# Patient Record
Sex: MALE | Race: WHITE | NOT HISPANIC OR LATINO | Employment: OTHER | URBAN - METROPOLITAN AREA
[De-identification: names, ages, dates, MRNs, and addresses within clinical notes are randomized per-mention and may not be internally consistent; named-entity substitution may affect disease eponyms.]

---

## 2018-07-22 ENCOUNTER — HOSPITAL ENCOUNTER (EMERGENCY)
Facility: HOSPITAL | Age: 53
Discharge: HOME | End: 2018-07-22
Attending: EMERGENCY MEDICINE
Payer: COMMERCIAL

## 2018-07-22 VITALS
RESPIRATION RATE: 16 BRPM | HEIGHT: 67 IN | DIASTOLIC BLOOD PRESSURE: 56 MMHG | TEMPERATURE: 98.7 F | SYSTOLIC BLOOD PRESSURE: 109 MMHG | BODY MASS INDEX: 21.97 KG/M2 | WEIGHT: 140 LBS | HEART RATE: 64 BPM | OXYGEN SATURATION: 99 %

## 2018-07-22 DIAGNOSIS — B20 HIV (HUMAN IMMUNODEFICIENCY VIRUS INFECTION) (HCC): ICD-10-CM

## 2018-07-22 DIAGNOSIS — N34.2 URETHRITIS: Primary | ICD-10-CM

## 2018-07-22 LAB
BACTERIA UR QL AUTO: ABNORMAL /HPF
BILIRUB UR QL STRIP: NEGATIVE
CLARITY UR: CLEAR
COLOR UR: YELLOW
GLUCOSE UR STRIP-MCNC: NEGATIVE MG/DL
HGB UR QL STRIP.AUTO: ABNORMAL
KETONES UR STRIP-MCNC: NEGATIVE MG/DL
LEUKOCYTE ESTERASE UR QL STRIP: ABNORMAL
NITRITE UR QL STRIP: NEGATIVE
NON-SQ EPI CELLS URNS QL MICRO: ABNORMAL /HPF
OTHER STN SPEC: ABNORMAL
PH UR STRIP.AUTO: 6 [PH] (ref 4.5–8)
PROT UR STRIP-MCNC: ABNORMAL MG/DL
RBC #/AREA URNS AUTO: ABNORMAL /HPF
SP GR UR STRIP.AUTO: 1.01 (ref 1–1.03)
UROBILINOGEN UR QL STRIP.AUTO: 0.2 E.U./DL
WBC #/AREA URNS AUTO: ABNORMAL /HPF

## 2018-07-22 PROCEDURE — 96372 THER/PROPH/DIAG INJ SC/IM: CPT

## 2018-07-22 PROCEDURE — 87591 N.GONORRHOEAE DNA AMP PROB: CPT | Performed by: EMERGENCY MEDICINE

## 2018-07-22 PROCEDURE — 81001 URINALYSIS AUTO W/SCOPE: CPT | Performed by: EMERGENCY MEDICINE

## 2018-07-22 PROCEDURE — 87086 URINE CULTURE/COLONY COUNT: CPT | Performed by: EMERGENCY MEDICINE

## 2018-07-22 PROCEDURE — 87491 CHLMYD TRACH DNA AMP PROBE: CPT | Performed by: EMERGENCY MEDICINE

## 2018-07-22 PROCEDURE — 99283 EMERGENCY DEPT VISIT LOW MDM: CPT

## 2018-07-22 RX ORDER — AZITHROMYCIN 250 MG/1
1000 TABLET, FILM COATED ORAL ONCE
Status: COMPLETED | OUTPATIENT
Start: 2018-07-22 | End: 2018-07-22

## 2018-07-22 RX ADMIN — AZITHROMYCIN 1000 MG: 250 TABLET, FILM COATED ORAL at 10:09

## 2018-07-22 RX ADMIN — CEFTRIAXONE SODIUM 250 MG: 250 INJECTION, POWDER, FOR SOLUTION INTRAMUSCULAR; INTRAVENOUS at 10:09

## 2018-07-22 NOTE — ED PROVIDER NOTES
History  Chief Complaint   Patient presents with    Exposure to STD     Patient stated that he thinks he has an STD     1d penile urethral drainage after unprotected intercourse  Source pt disease status not known but pt presents to ED concerned for STI  No fever chills vomiting weakness or systemic complaints, no abd pain or syncope  Currently  Symptomatic  Does have HIV but reports undetectable viral load and last CD4 count 900  None       Past Medical History:   Diagnosis Date    HIV (human immunodeficiency virus infection) ([facility] [address] )     Hyperlipidemia     Lyme disease        Past Surgical History:   Procedure Laterality Date    CYST REMOVAL         History reviewed  No pertinent family history  I have reviewed and agree with the history as documented  Social History   Substance Use Topics    Smoking status: Never Smoker    Smokeless tobacco: Never Used    Alcohol use No        Review of Systems   Constitutional: Negative for activity change, fatigue, fever and unexpected weight change  Eyes: Negative for discharge and itching  Respiratory: Negative for cough and shortness of breath  Cardiovascular: Negative for chest pain, palpitations and leg swelling  Gastrointestinal: Negative for abdominal distention and abdominal pain  Endocrine: Negative for polydipsia and polyuria  Genitourinary: Positive for difficulty urinating, discharge and dysuria  Negative for flank pain, frequency, hematuria, penile pain and urgency  Musculoskeletal: Negative for arthralgias, back pain, gait problem and joint swelling  Neurological: Negative for dizziness, weakness and headaches  Hematological: Negative  Physical Exam  Physical Exam   Constitutional: He is oriented to person, place, and time  He appears well-developed and well-nourished  HENT:   Head: Normocephalic and atraumatic  Eyes: EOM are normal  Pupils are equal, round, and reactive to light     Pulmonary/Chest: Effort normal    Musculoskeletal: Normal range of motion  Neurological: He is alert and oriented to person, place, and time  Skin: Skin is warm and dry  Capillary refill takes less than 2 seconds  Nursing note and vitals reviewed  Vital Signs  ED Triage Vitals [07/22/18 0934]   Temperature Pulse Respirations Blood Pressure SpO2   98 7 °F (37 1 °C) 64 16 109/56 99 %      Temp src Heart Rate Source Patient Position - Orthostatic VS BP Location FiO2 (%)   -- Monitor Sitting Right arm --      Pain Score       --           Vitals:    07/22/18 0934   BP: 109/56   Pulse: 64   Patient Position - Orthostatic VS: Sitting       Visual Acuity      ED Medications  Medications   cefTRIAXone (ROCEPHIN) 250 mg in sterile water IM only syringe (250 mg Intramuscular Given 7/22/18 1009)   azithromycin (ZITHROMAX) tablet 1,000 mg (1,000 mg Oral Given 7/22/18 1009)       Diagnostic Studies  Results Reviewed     Procedure Component Value Units Date/Time    Urine Microscopic [[id number]]  (Abnormal) Collected:  07/22/18 1007    Lab Status:  Final result Specimen:  Urine from Urine, Clean Catch Updated:  07/22/18 1229     RBC, UA 4-10 (A) /hpf      WBC, UA 10-20 (A) /hpf      Epithelial Cells Occasional /hpf      Bacteria, UA None Seen /hpf      OTHER OBSERVATIONS WBCs Clumped    UA w Reflex to Microscopic [[id number]]  (Abnormal) Collected:  07/22/18 1007    Lab Status:  Final result Specimen:  Urine from Urine, Clean Catch Updated:  07/22/18 1218     Color, UA Yellow     Clarity, UA Clear     Specific Gravity, UA 1 015     pH, UA 6 0     Leukocytes, UA Small (A)     Nitrite, UA Negative     Protein, UA 30 (1+) (A) mg/dl      Glucose, UA Negative mg/dl      Ketones, UA Negative mg/dl      Urobilinogen, UA 0 2 E U /dl      Bilirubin, UA Negative     Blood, UA Small (A)    Chlamydia/GC amplified DNA by PCR [[id number]] Collected:  07/22/18 1008    Lab Status:   In process Specimen:  Urine from Urine, Other Updated:  07/22/18 1017    Urine culture [[id number]] Collected:  07/22/18 1008    Lab Status: In process Specimen:  Urine from Urine, Clean Catch Updated:  07/22/18 1015                 No orders to display              Procedures  Procedures       Phone Contacts  ED Phone Contact    ED Course  ED Course as of Jul 22 1636   Sun Jul 22, 2018   2696 Dysuria and suspected STI based on pt report  Plan - empiric tx for sti, UA cx and GC/chlamydia pending at time of d/c                                MDM  Number of Diagnoses or Management Options  HIV (human immunodeficiency virus infection) ([facility] ):   Urethritis:   Diagnosis management comments: Suspected STI  Plan - abx for empiric coverage, urine for GC/chlamydia, d/c home, aware of need to contact sexual partners if positive, rted if worsening sxs etc         Amount and/or Complexity of Data Reviewed  Clinical lab tests: ordered and reviewed      CritCare Time    Disposition  Final diagnoses:   Urethritis   HIV (human immunodeficiency virus infection) ([facility] )     Time reflects when diagnosis was documented in both MDM as applicable and the Disposition within this note     Time User Action Codes Description Comment    7/22/2018  9:49 AM Colon Organ Add [N34 2] Urethritis     7/22/2018  9:49 AM Ramon, Skip Kail Add [B20] HIV (human immunodeficiency virus infection) [facility])       ED Disposition     ED Disposition Condition Comment    Discharge  Perez Azul discharge to home/self care  Condition at discharge: Stable        Follow-up Information    None         There are no discharge medications for this patient  No discharge procedures on file      ED Provider  Electronically Signed by           Saritha Mcclure MD  07/22/18 1345

## 2018-07-22 NOTE — DISCHARGE INSTRUCTIONS
Nonspecific Urethritis in Men   WHAT YOU NEED TO KNOW:   Nonspecific urethritis is a condition that causes inflammation of the urethra  The urethra is the tube where urine passes from the bladder to the outside of the body  Men who have sex with men and those with multiple sexual partners are at a high risk of having this condition  DISCHARGE INSTRUCTIONS:   Medicines:   · Antibiotics: This medicine is used to treat an infection caused by bacteria  Take them as directed  · Take your medicine as directed  Contact your healthcare provider if you think your medicine is not helping or if you have side effects  Tell him of her if you are allergic to any medicine  Keep a list of the medicines, vitamins, and herbs you take  Include the amounts, and when and why you take them  Bring the list or the pill bottles to follow-up visits  Carry your medicine list with you in case of an emergency  Follow up with your healthcare provider as directed:  Write down your questions so you remember to ask them during your visits  Manage your symptoms:   · Heat:  Sit in a warm bath for 15 minutes at least 2 times a day  · Do not use chemical irritants: This includes bath soaps, spermicides, or other products that may cause irritation  Prevent nonspecific urethritis:  If your urethritis was caused by an infection, the following may help to prevent the spread:  · Use condoms:  Wear a condom during oral, vaginal, and anal sex  Ask for more information about the correct way to use condoms  · Do not have sex with someone who has urethritis: This includes oral, vaginal, and anal sex  · Do not have sex during treatment:  Do not have sex while you or your partners are being treated  Ask when it is safe to have sex  · Report pregnancy:  Tell your healthcare provider if your female partner is pregnant  You may have spread an infection to her, and she may pass it to her infant during birth    Contact your healthcare provider if:   · You have a fever  · You have chills, a cough, or feel weak and achy  · You continue to have signs or symptoms after being treated for nonspecific urethritis  · You have questions or concerns about your condition or care  Return to the emergency department if:   · You have joint stiffness, muscle pain, or eye inflammation  · You have pain and swelling in your scrotum  · You have severe abdominal pain  · You have chest pain or trouble breathing  © 2017 [facility] Information is for End User's use only and may not be sold, redistributed or otherwise used for commercial purposes  All illustrations and images included in CareNotes® are the copyrighted property of A D A M , Inc  or Ariel Marinelli  The above information is an  only  It is not intended as medical advice for individual conditions or treatments  Talk to your doctor, nurse or pharmacist before following any medical regimen to see if it is safe and effective for you

## 2018-07-23 LAB — BACTERIA UR CULT: NORMAL

## 2018-08-01 LAB
CHLAMYDIA DNA CVX QL NAA+PROBE: ABNORMAL
N GONORRHOEA DNA GENITAL QL NAA+PROBE: ABNORMAL

## 2019-07-10 ENCOUNTER — TELEPHONE (OUTPATIENT)
Dept: NEPHROLOGY | Facility: CLINIC | Age: 54
End: 2019-07-10

## 2019-07-16 ENCOUNTER — TELEPHONE (OUTPATIENT)
Dept: NEPHROLOGY | Facility: CLINIC | Age: 54
End: 2019-07-16

## 2019-07-16 NOTE — TELEPHONE ENCOUNTER
Called and left a message on machine for patient confirming his appointment for Thursday 7/18/2019 Nephrology Consult with Dr Irina Cheatham Sender,

## 2019-07-18 ENCOUNTER — CONSULT (OUTPATIENT)
Dept: NEPHROLOGY | Facility: CLINIC | Age: 54
End: 2019-07-18
Payer: COMMERCIAL

## 2019-07-18 VITALS
DIASTOLIC BLOOD PRESSURE: 80 MMHG | TEMPERATURE: 98.1 F | WEIGHT: 145 LBS | SYSTOLIC BLOOD PRESSURE: 126 MMHG | RESPIRATION RATE: 16 BRPM | BODY MASS INDEX: 22.76 KG/M2 | HEART RATE: 57 BPM | HEIGHT: 67 IN

## 2019-07-18 DIAGNOSIS — N18.30 CKD (CHRONIC KIDNEY DISEASE) STAGE 3, GFR 30-59 ML/MIN (HCC): Primary | ICD-10-CM

## 2019-07-18 PROCEDURE — 99204 OFFICE O/P NEW MOD 45 MIN: CPT | Performed by: INTERNAL MEDICINE

## 2019-07-18 RX ORDER — PRAVASTATIN SODIUM 20 MG
TABLET ORAL
COMMUNITY
Start: 2019-07-08

## 2019-07-18 NOTE — PROGRESS NOTES
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, chronic kidney disease stage 3, proteinuria who is referred to Renal Clinic by his PCP due to elevated renal parameters  Patient is well known to having underlying kidney disease and had seen a nephrologist approximately 4 years ago for total of 2 years  Patient states that he was told that his kidney function is stable and does not need to be seen in the renal clinic  In the year 2017, patient was noted to have serum creatinine as high as 1 6 with estimated GFR 42  Repeat labs performed in the same year continued to show estimated GFR of 60  He was also noted to have macroalbuminuria up to 350 mg  Patient denies having any foamy urine or taking nephrotoxic medications such as NSAIDs in the interim          PAST MEDICAL HISTORY:  Past Medical History:   Diagnosis Date    HIV [facility]human immunodeficiency virus infection) ([facility] [address] )     Hyperlipidemia     Lyme disease        PAST SURGICAL HISTORY:  Past Surgical History:   Procedure Laterality Date    CYST REMOVAL         SOCIAL HISTORY:  Social History     Substance and Sexual Activity   Alcohol Use No     Social History     Substance and Sexual Activity   Drug Use No     Social History     Tobacco Use   Smoking Status Never Smoker   Smokeless Tobacco Never Used       FAMILY HISTORY:  Family History   Problem Relation Age of Onset    Hypertension Mother     No Known Problems Father        ALLERGY:  No Known Allergies    MEDICATIONS:    Current Outpatient Medications:     bictegravir-emtricitab-tenofovir alafenamide (BIKTARVY) -25 MG tablet, Take by mouth, Disp: , Rfl:     pravastatin (PRAVACHOL) 20 mg tablet, , Disp: , Rfl:     REVIEW OF SYSTEMS:    Review of Systems   Constitutional: Negative  HENT: Negative  Eyes: Negative  Respiratory: Negative  Cardiovascular: Negative  Gastrointestinal: Negative  Endocrine: Negative  Genitourinary: Negative  Musculoskeletal: Positive for arthralgias  Skin: Negative  Allergic/Immunologic: Negative  Neurological: Negative  Hematological: Negative  All other systems reviewed and are negative  PHYSICAL EXAM:  Vitals:    07/18/19 1301   BP: 126/80   BP Location: Left arm   Patient Position: Sitting   Cuff Size: Adult   Pulse: 57   Resp: 16   Temp: 98 1 °F (36 7 °C)   TempSrc: Oral   Weight: 65 8 kg (145 lb)   Height: 5' 7" (1 702 m)     Body mass index is 22 71 kg/m²  Physical Exam   Constitutional: He is oriented to person, place, and time  He appears well-developed and well-nourished  HENT:   Head: Normocephalic and atraumatic  Eyes: Pupils are equal, round, and reactive to light  Neck: Neck supple  Cardiovascular: Normal rate, regular rhythm and normal heart sounds  Pulmonary/Chest: Effort normal    Abdominal: Soft  Bowel sounds are normal    Musculoskeletal: Normal range of motion  Neurological: He is alert and oriented to person, place, and time  Skin: Skin is warm  Psychiatric: He has a normal mood and affect         LAB RESULTS:  Results for orders placed or performed during the hospital encounter of 07/22/18   Urine culture   Result Value Ref Range    Urine Culture No Growth <1000 cfu/mL    Chlamydia/GC amplified DNA by PCR   Result Value Ref Range    N gonorrhoeae, DNA Probe N  gonorrhoeae Amplified DNA POSITIVE (A) N  gonorrhoeae Amplified DNA Negative    Chlamydia, DNA Probe C  trachomatis Amplified DNA Negative C  trachomatis Amplified DNA Negative   UA w Reflex to Microscopic   Result Value Ref Range    Color, UA Yellow     Clarity, UA Clear     Specific [place], UA 1 015 1 003 - 1 030    pH, UA 6 0 4 5 - 8 0    Leukocytes, UA Small (A) Negative    Nitrite, UA Negative Negative    Protein, UA 30 (1+) (A) Negative mg/dl    Glucose, UA Negative Negative mg/dl    Ketones, UA Negative Negative mg/dl    Urobilinogen, UA 0 2 0 2, 1 0 E U /dl E U /dl    Bilirubin, UA Negative Negative    Blood, UA Small (A) Negative   Urine Microscopic   Result Value Ref Range    RBC, UA 4-10 (A) None Seen, 0-5 /hpf    WBC, UA 10-20 (A) None Seen, 0-5, 5-55, 5-65 /hpf    Epithelial Cells Occasional None Seen, Occasional /hpf    Bacteria, UA None Seen None Seen, Occasional /hpf    OTHER OBSERVATIONS WBCs Clumped          ASSESSMENT and PLAN:  Butler Lanes was seen today for consult  Diagnoses and all orders for this visit:    CKD (chronic kidney disease) stage 3, GFR 30-59 ml/min ([facility])  -     Albumin; Standing  -     Calcium; Standing  -     CBC and differential; Standing  -     Comprehensive metabolic panel; Standing  -     Phosphorus; Standing  -     Protein / creatinine ratio, urine; Standing  -     PTH, intact; Standing  -     Urinalysis with microscopic; Standing  -     Vitamin D 25 hydroxy; Standing  -     US kidney and bladder; Future  -     Albumin  -     Calcium  -     CBC and differential  -     Comprehensive metabolic panel  -     Phosphorus  -     Protein / creatinine ratio, urine  -     PTH, intact  -     Urinalysis with microscopic  -     Vitamin D 25 hydroxy      44-year-old male with past He of HIV, cryptococcal meningitis, proteinuria, chronic kidney disease stage 3 who is referred to Renal Clinic for management of CKD    1  Chronic kidney disease stage 3:  Etiology unknown but 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 or secondary to effects of HAART medication  Compared to 2 years ago, patient's serum creatinine appears to be stable at 1 7  We will Perform a renal ultrasound to evaluate patient's renal parenchyma  Avoidance of nephrotoxic medications such as NSAIDs recommended      2  Proteinuria:  Patient noted to have microalbuminuria at least 2 years ago will repeat urinalysis and assess proteinuria prior to next visit  3  Bone mineral disorder:  Will assess patient's 25 hydroxy vitamin-D status, parathyroid hormone intact, calcium phosphorus levels  4  HIV:  Appears to be stable while on medications  The most recent CD4 count was noted to be 900  Patient was instructed that he has remains at risk of glomerulonephritis emanating from HIV disease in his lifetime  5  Nutrition:  Protein diet up to 40 g daily, moderate potassium and phosphorus diet recommended  Elda Horne

## 2019-07-18 NOTE — PATIENT INSTRUCTIONS
Drink atleat 2 5 L of fluids daily  Eat moderate amounts of protein, potassium and phosphorus  Perform labs prior to next appointment  Avoid NSAIDs

## 2019-10-18 ENCOUNTER — APPOINTMENT (OUTPATIENT)
Dept: LAB | Facility: HOSPITAL | Age: 54
End: 2019-10-18
Payer: COMMERCIAL

## 2019-10-18 ENCOUNTER — HOSPITAL ENCOUNTER (OUTPATIENT)
Dept: ULTRASOUND IMAGING | Facility: HOSPITAL | Age: 54
Discharge: HOME | End: 2019-10-18
Payer: COMMERCIAL

## 2019-10-18 DIAGNOSIS — N18.30 CKD (CHRONIC KIDNEY DISEASE) STAGE 3, GFR 30-59 ML/MIN (HCC): ICD-10-CM

## 2019-10-18 LAB
25(OH)D3 SERPL-MCNC: 29.9 NG/ML (ref 30–100)
ALBUMIN SERPL BCP-MCNC: 3.9 G/DL (ref 3.5–5)
ALP SERPL-CCNC: 99 U/L (ref 46–116)
ALT SERPL W P-5'-P-CCNC: 64 U/L (ref 12–78)
ANION GAP SERPL CALCULATED.3IONS-SCNC: 9 MMOL/L (ref 4–13)
AST SERPL W P-5'-P-CCNC: 33 U/L (ref 5–45)
BACTERIA UR QL AUTO: ABNORMAL /HPF
BASOPHILS # BLD AUTO: 0.06 THOUSANDS/ΜL (ref 0–0.1)
BASOPHILS NFR BLD AUTO: 1 % (ref 0–1)
BILIRUB SERPL-MCNC: 1 MG/DL (ref 0.2–1)
BILIRUB UR QL STRIP: NEGATIVE
BUN SERPL-MCNC: 15 MG/DL (ref 5–25)
CALCIUM SERPL-MCNC: 9 MG/DL (ref 8.3–10.1)
CHLORIDE SERPL-SCNC: 99 MMOL/L (ref 100–108)
CLARITY UR: CLEAR
CO2 SERPL-SCNC: 29 MMOL/L (ref 21–32)
COLOR UR: ABNORMAL
CREAT SERPL-MCNC: 1.53 MG/DL (ref 0.6–1.3)
CREAT UR-MCNC: 22.4 MG/DL
EOSINOPHIL # BLD AUTO: 0.18 THOUSAND/ΜL (ref 0–0.61)
EOSINOPHIL NFR BLD AUTO: 2 % (ref 0–6)
ERYTHROCYTE [DISTWIDTH] IN BLOOD BY AUTOMATED COUNT: 12.4 % (ref 11.6–15.1)
GFR SERPL CREATININE-BSD FRML MDRD: 51 ML/MIN/1.73SQ M
GLUCOSE SERPL-MCNC: 77 MG/DL (ref 65–140)
GLUCOSE UR STRIP-MCNC: NEGATIVE MG/DL
HCT VFR BLD AUTO: 49 % (ref 36.5–49.3)
HGB BLD-MCNC: 17.2 G/DL (ref 12–17)
HGB UR QL STRIP.AUTO: NEGATIVE
IMM GRANULOCYTES # BLD AUTO: 0.04 THOUSAND/UL (ref 0–0.2)
IMM GRANULOCYTES NFR BLD AUTO: 1 % (ref 0–2)
KETONES UR STRIP-MCNC: NEGATIVE MG/DL
LEUKOCYTE ESTERASE UR QL STRIP: NEGATIVE
LYMPHOCYTES # BLD AUTO: 3.08 THOUSANDS/ΜL (ref 0.6–4.47)
LYMPHOCYTES NFR BLD AUTO: 35 % (ref 14–44)
MCH RBC QN AUTO: 32.3 PG (ref 26.8–34.3)
MCHC RBC AUTO-ENTMCNC: 35.1 G/DL (ref 31.4–37.4)
MCV RBC AUTO: 92 FL (ref 82–98)
MONOCYTES # BLD AUTO: 0.96 THOUSAND/ΜL (ref 0.17–1.22)
MONOCYTES NFR BLD AUTO: 11 % (ref 4–12)
NEUTROPHILS # BLD AUTO: 4.45 THOUSANDS/ΜL (ref 1.85–7.62)
NEUTS SEG NFR BLD AUTO: 50 % (ref 43–75)
NITRITE UR QL STRIP: NEGATIVE
NON-SQ EPI CELLS URNS QL MICRO: ABNORMAL /HPF
NRBC BLD AUTO-RTO: 0 /100 WBCS
PH UR STRIP.AUTO: 5.5 [PH]
PHOSPHATE SERPL-MCNC: 2.8 MG/DL (ref 2.7–4.5)
PLATELET # BLD AUTO: 228 THOUSANDS/UL (ref 149–390)
PMV BLD AUTO: 10.5 FL (ref 8.9–12.7)
POTASSIUM SERPL-SCNC: 4.2 MMOL/L (ref 3.5–5.3)
PROT SERPL-MCNC: 8.1 G/DL (ref 6.4–8.2)
PROT UR STRIP-MCNC: NEGATIVE MG/DL
PROT UR-MCNC: 12 MG/DL
PROT/CREAT UR: 0.54 MG/G{CREAT} (ref 0–0.1)
PTH-INTACT SERPL-MCNC: 67.1 PG/ML (ref 18.4–80.1)
RBC # BLD AUTO: 5.33 MILLION/UL (ref 3.88–5.62)
RBC #/AREA URNS AUTO: ABNORMAL /HPF
SODIUM SERPL-SCNC: 137 MMOL/L (ref 136–145)
SP GR UR STRIP.AUTO: <=1.005 (ref 1–1.03)
UROBILINOGEN UR QL STRIP.AUTO: 0.2 E.U./DL
WBC # BLD AUTO: 8.77 THOUSAND/UL (ref 4.31–10.16)
WBC #/AREA URNS AUTO: ABNORMAL /HPF

## 2019-10-18 PROCEDURE — 76770 US EXAM ABDO BACK WALL COMP: CPT

## 2019-10-18 PROCEDURE — 85025 COMPLETE CBC W/AUTO DIFF WBC: CPT | Performed by: INTERNAL MEDICINE

## 2019-10-18 PROCEDURE — 36415 COLL VENOUS BLD VENIPUNCTURE: CPT | Performed by: INTERNAL MEDICINE

## 2019-10-18 PROCEDURE — 82570 ASSAY OF URINE CREATININE: CPT | Performed by: INTERNAL MEDICINE

## 2019-10-18 PROCEDURE — 83970 ASSAY OF PARATHORMONE: CPT | Performed by: INTERNAL MEDICINE

## 2019-10-18 PROCEDURE — 84156 ASSAY OF PROTEIN URINE: CPT | Performed by: INTERNAL MEDICINE

## 2019-10-18 PROCEDURE — 80053 COMPREHEN METABOLIC PANEL: CPT | Performed by: INTERNAL MEDICINE

## 2019-10-18 PROCEDURE — 82306 VITAMIN D 25 HYDROXY: CPT | Performed by: INTERNAL MEDICINE

## 2019-10-18 PROCEDURE — 84100 ASSAY OF PHOSPHORUS: CPT | Performed by: INTERNAL MEDICINE

## 2019-10-18 PROCEDURE — 81001 URINALYSIS AUTO W/SCOPE: CPT | Performed by: INTERNAL MEDICINE

## 2019-10-24 ENCOUNTER — OFFICE VISIT (OUTPATIENT)
Dept: NEPHROLOGY | Facility: CLINIC | Age: 54
End: 2019-10-24
Payer: COMMERCIAL

## 2019-10-24 VITALS
BODY MASS INDEX: 22.55 KG/M2 | HEIGHT: 68 IN | SYSTOLIC BLOOD PRESSURE: 130 MMHG | WEIGHT: 148.8 LBS | DIASTOLIC BLOOD PRESSURE: 82 MMHG | TEMPERATURE: 97.8 F | HEART RATE: 54 BPM | RESPIRATION RATE: 16 BRPM

## 2019-10-24 DIAGNOSIS — R80.1 PERSISTENT PROTEINURIA: Primary | ICD-10-CM

## 2019-10-24 PROCEDURE — 99214 OFFICE O/P EST MOD 30 MIN: CPT | Performed by: INTERNAL MEDICINE

## 2019-10-24 RX ORDER — LISINOPRIL 5 MG/1
5 TABLET ORAL DAILY
Qty: 90 TABLET | Refills: 3 | Status: SHIPPED | OUTPATIENT
Start: 2019-10-24 | End: 2021-03-03

## 2019-10-24 NOTE — PROGRESS NOTES
NEPHROLOGY PROGRESS NOTE    Patient: Pelon Headley               Sex: male          DOA: No admission date for patient encounter  YOB: 1965        Age:  48 y o         LOS: [unfilled]  10/24/2019        BACKGROUND     48 y o  M with PMH of HIV disease, cryptococcal meningitis, CKD III who has been following up in Renal clinic since at least 2017  SUBJECTIVE      Patient following up after 3 months  Offers no major complaints except ankle pain  REVIEW OF SYSTEMS     Review of Systems   Constitutional: Negative  HENT: Negative  Eyes: Negative  Respiratory: Negative  Cardiovascular: Negative  Gastrointestinal: Negative  Endocrine: Negative  Genitourinary: Negative  Musculoskeletal: Positive for arthralgias  Skin: Negative  Allergic/Immunologic: Negative  Neurological: Negative  Hematological: Negative  All other systems reviewed and are negative  OBJECTIVE     Current Weight: Weight - Scale: 67 5 kg (148 lb 12 8 oz)  Vitals:    10/24/19 1410   BP: 130/82   Pulse: (!) 54   Resp: 16   Temp: 97 8 °F (36 6 °C)     Body mass index is 22 62 kg/m²  [unfilled]    CURRENT MEDICATIONS       Current Outpatient Medications:     bictegravir-emtricitab-tenofovir alafenamide (BIKTARVY) -25 MG tablet, Take by mouth, Disp: , Rfl:     pravastatin (PRAVACHOL) 20 mg tablet, , Disp: , Rfl:     lisinopril (ZESTRIL) 5 mg tablet, Take 1 tablet (5 mg total) by mouth daily, Disp: 90 tablet, Rfl: 3      PHYSICAL EXAMINATION     Physical Exam   Constitutional: He is oriented to person, place, and time  HENT:   Head: Normocephalic and atraumatic  Eyes: Pupils are equal, round, and reactive to light  Neck: Neck supple  No JVD present  Cardiovascular: Normal rate, regular rhythm and normal heart sounds  Exam reveals no friction rub  No murmur heard  Pulmonary/Chest: Effort normal and breath sounds normal    Abdominal: Soft   Bowel sounds are normal  He exhibits no distension  There is no tenderness  There is no rebound  Musculoskeletal: He exhibits no edema or tenderness  Neurological: He is alert and oriented to person, place, and time  Skin: Skin is dry  No rash noted  Psychiatric: He has a normal mood and affect  LAB RESULTS     Results from last 7 days   Lab Units 10/18/19  1428   WBC Thousand/uL 8 77   HEMOGLOBIN g/dL 17 2*   HEMATOCRIT % 49 0   PLATELETS Thousands/uL 228   POTASSIUM mmol/L 4 2   CHLORIDE mmol/L 99*   CO2 mmol/L 29   BUN mg/dL 15   CREATININE mg/dL 1 53*   EGFR ml/min/1 73sq m 51   CALCIUM mg/dL 9 0   PHOSPHORUS mg/dL 2 8           RADIOLOGY RESULTS      Reviewed  ASSESSMENT/PLAN     48 M with PMH of CKD III, cryptococcal meningitis, HIV disease who presents to Renal clinic for f/u     1) CKD III: 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

## 2020-02-27 ENCOUNTER — DOCUMENTATION (OUTPATIENT)
Dept: NEPHROLOGY | Facility: CLINIC | Age: 55
End: 2020-02-27

## 2020-02-27 LAB
CHOLEST SERPL-MCNC: 161 MG/DL (ref 50–200)
EXT DIFF-ABS BASOPHILS: 0.1
EXT DIFF-ABS EOSINOPHILS: 0.1
EXT DIFF-ABS LYMPHOCYTES: 2.6
EXT DIFF-ABS MONOCYTES: 0.5
EXT DIFF-ABS NEUTROPHILS: 2.5
EXT GLUCOSE BLD: 90
EXTERNAL ALBUMIN: 4.4
EXTERNAL ALK PHOS: 82
EXTERNAL ALT: 39
EXTERNAL ANION GAP: 1.5
EXTERNAL AST: 27
EXTERNAL BUN: 17
EXTERNAL CALCIUM: 9.1
EXTERNAL CHLORIDE: 99
EXTERNAL CREATININE: 1.54
EXTERNAL EGFR: 50
EXTERNAL HEMATOCRIT: 47 %
EXTERNAL HEMOGLOBIN: 16.2 G/DL
EXTERNAL MCV: 94
EXTERNAL PLATELET COUNT: 202 K/ΜL
EXTERNAL POTASSIUM: 4.8
EXTERNAL RBC: 4.94
EXTERNAL RDW: 13.9
EXTERNAL SODIUM: 136
EXTERNAL T.BILIRUBIN: 0.6
EXTERNAL TOTAL PROTEIN: 7.3
EXTERNAL WBC: 5.8
HDLC SERPL-MCNC: 62 MG/DL (ref 40–?)
HIV-1 RNA BY PCR, QN (HISTORICAL): <20
HIV1 RNA SERPL NAA+PROBE-LOG#: NORMAL {LOG_COPIES}/ML
LDLC SERPL CALC-MCNC: 84 MG/DL (ref 0–100)
TRIGL SERPL-MCNC: 75 MG/DL (ref ?–150)
VLDLC SERPL CALC-MCNC: 15 MG/DL

## 2020-04-10 ENCOUNTER — TELEPHONE (OUTPATIENT)
Dept: NEPHROLOGY | Facility: CLINIC | Age: 55
End: 2020-04-10

## 2020-04-13 ENCOUNTER — APPOINTMENT (OUTPATIENT)
Dept: LAB | Facility: HOSPITAL | Age: 55
End: 2020-04-13
Payer: COMMERCIAL

## 2020-04-27 ENCOUNTER — TELEMEDICINE (OUTPATIENT)
Dept: NEPHROLOGY | Facility: CLINIC | Age: 55
End: 2020-04-27
Payer: COMMERCIAL

## 2020-04-27 DIAGNOSIS — N18.30 CKD (CHRONIC KIDNEY DISEASE) STAGE 3, GFR 30-59 ML/MIN (HCC): Primary | ICD-10-CM

## 2020-04-27 PROCEDURE — 99214 OFFICE O/P EST MOD 30 MIN: CPT | Performed by: INTERNAL MEDICINE

## 2020-06-25 ENCOUNTER — TELEPHONE (OUTPATIENT)
Dept: NEPHROLOGY | Facility: CLINIC | Age: 55
End: 2020-06-25

## 2020-08-24 ENCOUNTER — APPOINTMENT (OUTPATIENT)
Dept: LAB | Facility: HOSPITAL | Age: 55
End: 2020-08-24
Attending: INTERNAL MEDICINE
Payer: COMMERCIAL

## 2020-08-24 LAB
25(OH)D3 SERPL-MCNC: 35.7 NG/ML (ref 30–100)
ALBUMIN SERPL BCP-MCNC: 4.1 G/DL (ref 3.5–5)
ALP SERPL-CCNC: 79 U/L (ref 46–116)
ALT SERPL W P-5'-P-CCNC: 46 U/L (ref 12–78)
ANION GAP SERPL CALCULATED.3IONS-SCNC: 8 MMOL/L (ref 4–13)
AST SERPL W P-5'-P-CCNC: 29 U/L (ref 5–45)
BACTERIA UR QL AUTO: NORMAL /HPF
BASOPHILS # BLD AUTO: 0.04 THOUSANDS/ΜL (ref 0–0.1)
BASOPHILS NFR BLD AUTO: 1 % (ref 0–1)
BILIRUB SERPL-MCNC: 0.8 MG/DL (ref 0.2–1)
BILIRUB UR QL STRIP: NEGATIVE
BUN SERPL-MCNC: 16 MG/DL (ref 5–25)
CALCIUM SERPL-MCNC: 8.8 MG/DL (ref 8.3–10.1)
CHLORIDE SERPL-SCNC: 101 MMOL/L (ref 100–108)
CLARITY UR: CLEAR
CO2 SERPL-SCNC: 28 MMOL/L (ref 21–32)
COLOR UR: YELLOW
CREAT SERPL-MCNC: 1.69 MG/DL (ref 0.6–1.3)
CREAT UR-MCNC: 109 MG/DL
EOSINOPHIL # BLD AUTO: 0.09 THOUSAND/ΜL (ref 0–0.61)
EOSINOPHIL NFR BLD AUTO: 1 % (ref 0–6)
ERYTHROCYTE [DISTWIDTH] IN BLOOD BY AUTOMATED COUNT: 12.3 % (ref 11.6–15.1)
GFR SERPL CREATININE-BSD FRML MDRD: 45 ML/MIN/1.73SQ M
GLUCOSE P FAST SERPL-MCNC: 90 MG/DL (ref 65–99)
GLUCOSE UR STRIP-MCNC: NEGATIVE MG/DL
HCT VFR BLD AUTO: 50.4 % (ref 36.5–49.3)
HGB BLD-MCNC: 17.2 G/DL (ref 12–17)
HGB UR QL STRIP.AUTO: NEGATIVE
IMM GRANULOCYTES # BLD AUTO: 0.02 THOUSAND/UL (ref 0–0.2)
IMM GRANULOCYTES NFR BLD AUTO: 0 % (ref 0–2)
KETONES UR STRIP-MCNC: NEGATIVE MG/DL
LEUKOCYTE ESTERASE UR QL STRIP: NEGATIVE
LYMPHOCYTES # BLD AUTO: 3.07 THOUSANDS/ΜL (ref 0.6–4.47)
LYMPHOCYTES NFR BLD AUTO: 47 % (ref 14–44)
MCH RBC QN AUTO: 31.6 PG (ref 26.8–34.3)
MCHC RBC AUTO-ENTMCNC: 34.1 G/DL (ref 31.4–37.4)
MCV RBC AUTO: 93 FL (ref 82–98)
MONOCYTES # BLD AUTO: 0.75 THOUSAND/ΜL (ref 0.17–1.22)
MONOCYTES NFR BLD AUTO: 12 % (ref 4–12)
NEUTROPHILS # BLD AUTO: 2.5 THOUSANDS/ΜL (ref 1.85–7.62)
NEUTS SEG NFR BLD AUTO: 39 % (ref 43–75)
NITRITE UR QL STRIP: NEGATIVE
NON-SQ EPI CELLS URNS QL MICRO: NORMAL /HPF
NRBC BLD AUTO-RTO: 0 /100 WBCS
PH UR STRIP.AUTO: 6.5 [PH]
PHOSPHATE SERPL-MCNC: 2.4 MG/DL (ref 2.7–4.5)
PLATELET # BLD AUTO: 227 THOUSANDS/UL (ref 149–390)
PMV BLD AUTO: 10.7 FL (ref 8.9–12.7)
POTASSIUM SERPL-SCNC: 4.2 MMOL/L (ref 3.5–5.3)
PROT SERPL-MCNC: 8.4 G/DL (ref 6.4–8.2)
PROT UR STRIP-MCNC: NEGATIVE MG/DL
PROT UR-MCNC: 32 MG/DL
PROT/CREAT UR: 0.29 MG/G{CREAT} (ref 0–0.1)
PTH-INTACT SERPL-MCNC: 56.4 PG/ML (ref 18.4–80.1)
RBC # BLD AUTO: 5.45 MILLION/UL (ref 3.88–5.62)
RBC #/AREA URNS AUTO: NORMAL /HPF
SODIUM SERPL-SCNC: 137 MMOL/L (ref 136–145)
SP GR UR STRIP.AUTO: 1.01 (ref 1–1.03)
UROBILINOGEN UR QL STRIP.AUTO: 0.2 E.U./DL
WBC # BLD AUTO: 6.47 THOUSAND/UL (ref 4.31–10.16)
WBC #/AREA URNS AUTO: NORMAL /HPF

## 2020-08-24 PROCEDURE — 80053 COMPREHEN METABOLIC PANEL: CPT | Performed by: INTERNAL MEDICINE

## 2020-08-24 PROCEDURE — 84156 ASSAY OF PROTEIN URINE: CPT | Performed by: INTERNAL MEDICINE

## 2020-08-24 PROCEDURE — 83970 ASSAY OF PARATHORMONE: CPT | Performed by: INTERNAL MEDICINE

## 2020-08-24 PROCEDURE — 36415 COLL VENOUS BLD VENIPUNCTURE: CPT | Performed by: INTERNAL MEDICINE

## 2020-08-24 PROCEDURE — 82570 ASSAY OF URINE CREATININE: CPT | Performed by: INTERNAL MEDICINE

## 2020-08-24 PROCEDURE — 81001 URINALYSIS AUTO W/SCOPE: CPT | Performed by: INTERNAL MEDICINE

## 2020-08-24 PROCEDURE — 84100 ASSAY OF PHOSPHORUS: CPT | Performed by: INTERNAL MEDICINE

## 2020-08-24 PROCEDURE — 82306 VITAMIN D 25 HYDROXY: CPT | Performed by: INTERNAL MEDICINE

## 2020-08-24 PROCEDURE — 85025 COMPLETE CBC W/AUTO DIFF WBC: CPT | Performed by: INTERNAL MEDICINE

## 2020-08-27 ENCOUNTER — TELEPHONE (OUTPATIENT)
Dept: NEPHROLOGY | Facility: CLINIC | Age: 55
End: 2020-08-27

## 2020-08-27 NOTE — TELEPHONE ENCOUNTER
Patient was int he office today, 8/27/2020, stated he must have forgotten his appt was rescheduled  Patient stated he did labs for today  I did inform the patient he should be ok as his next appt is only in 5 days, 9/2/2020   [id number]

## 2020-09-01 ENCOUNTER — TELEPHONE (OUTPATIENT)
Dept: NEPHROLOGY | Facility: CLINIC | Age: 55
End: 2020-09-01

## 2020-09-02 ENCOUNTER — OFFICE VISIT (OUTPATIENT)
Dept: NEPHROLOGY | Facility: CLINIC | Age: 55
End: 2020-09-02
Payer: COMMERCIAL

## 2020-09-02 VITALS
WEIGHT: 144.2 LBS | DIASTOLIC BLOOD PRESSURE: 78 MMHG | SYSTOLIC BLOOD PRESSURE: 108 MMHG | TEMPERATURE: 97.4 F | BODY MASS INDEX: 22.63 KG/M2 | HEART RATE: 62 BPM | HEIGHT: 67 IN

## 2020-09-02 DIAGNOSIS — N18.30 CKD (CHRONIC KIDNEY DISEASE) STAGE 3, GFR 30-59 ML/MIN (HCC): Primary | ICD-10-CM

## 2020-09-02 PROCEDURE — 99215 OFFICE O/P EST HI 40 MIN: CPT | Performed by: INTERNAL MEDICINE

## 2020-09-02 NOTE — PROGRESS NOTES
NEPHROLOGY PROGRESS NOTE    Patient: Natalia Persaud               Sex: male          DOA: No admission date for patient encounter  YOB: 1965        Age:  47 y o         LOS: [unfilled]  9/2/2020        BACKGROUND     48 y o  M with PMH of HIV disease, cryptococcal meningitis, CKD III who has been following up in Renal clinic since at least 2017  SUBJECTIVE      Patient following up after 4 months  Last visit was via telemedicine  States that he is in good held and denies any chest pain, shortness of breath, blood in the urine, abdominal pain, lower extremity edema  Denies any further low blood pressure episodes  REVIEW OF SYSTEMS     Review of Systems   Constitutional: Negative  HENT: Negative  Eyes: Negative  Respiratory: Negative  Cardiovascular: Negative  Gastrointestinal: Negative  Endocrine: Negative  Genitourinary: Negative  Musculoskeletal: Negative  Skin: Negative  Allergic/Immunologic: Negative  Neurological: Negative  Hematological: Negative  All other systems reviewed and are negative  OBJECTIVE     Current Weight: Weight - Scale: 65 4 kg (144 lb 3 2 oz)  Vitals:    09/02/20 0959   BP: 108/78   Pulse: 62   Temp: (!) 97 4 °F (36 3 °C)     Body mass index is 22 58 kg/m²  [unfilled]    CURRENT MEDICATIONS       Current Outpatient Medications:     bictegravir-emtricitab-tenofovir alafenamide (BIKTARVY) -25 MG tablet, Take by mouth, Disp: , Rfl:     lisinopril (ZESTRIL) 5 mg tablet, Take 1 tablet (5 mg total) by mouth daily, Disp: 90 tablet, Rfl: 3    pravastatin (PRAVACHOL) 20 mg tablet, , Disp: , Rfl:       PHYSICAL EXAMINATION     Physical Exam  HENT:      Head: Normocephalic and atraumatic  Eyes:      Pupils: Pupils are equal, round, and reactive to light  Neck:      Musculoskeletal: Neck supple  Vascular: No JVD  Cardiovascular:      Rate and Rhythm: Normal rate and regular rhythm        Heart sounds: Normal heart sounds  No murmur  No friction rub  Pulmonary:      Effort: Pulmonary effort is normal       Breath sounds: Normal breath sounds  Abdominal:      General: Bowel sounds are normal  There is no distension  Palpations: Abdomen is soft  Tenderness: There is no abdominal tenderness  There is no rebound  Musculoskeletal:         General: No tenderness  Skin:     General: Skin is dry  Findings: No rash  Neurological:      Mental Status: He is alert and oriented to person, place, and time  LAB RESULTS     Results from last 6 Months   Lab Units 08/24/20  0933 08/24/20  0932 04/13/20  1418   WBC Thousand/uL 6 47  --  7 48   HEMOGLOBIN g/dL 17 2*  --  17 3*   HEMATOCRIT % 50 4*  --  50 3*   PLATELETS Thousands/uL 227  --  250   POTASSIUM mmol/L  --  4 2 3 8   CHLORIDE mmol/L  --  101 101   CO2 mmol/L  --  28 28   BUN mg/dL  --  16 14   CREATININE mg/dL  --  1 69* 1 71*   CALCIUM mg/dL  --  8 8 8 6   PHOSPHORUS mg/dL  --  2 4* 2 5*             RADIOLOGY RESULTS      Reviewed  ASSESSMENT/PLAN     48 M with PMH of CKD III, cryptococcal meningitis, HIV disease who presents to Renal clinic for f/u     1) CKD III: 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, HAART therapy that may have caused kidney dysfunction  Baseline S Cr has been 1 6 since at least 2017  Labs performed revealed S Cr of 1 6 and stable  Renal US revealed normal sized kidneys    -avoidance of NSAIDs, contrast agents reiterated  2) Proteinuria: Noted to have 290 mg of proteinuria per UPC criteria  Target is less than 110 milligrams/gram of creatinine per UPC criteria  On lisinopril 2 5 mg once daily  3) secondary hyperparathyroidism of renal origin:  Parathyroid hormone intact is on target  Noted vitamin-D levels to be sufficient  Hypophosphatemia persists and hence recommended patient to take vitamin D3 on daily basis  Normal calcium levels  4) Anemia due to CKD stage 3: Hb is 17 and above target       5) Nutrition: Moderate potassium, low to moderate protein intake up to 40 g recommended  6  HIV:  On HAART therapy with low viral load and normal T cells  Follow-up in 6 months              Megan Gomes MD  Nephrology  9/2/2020

## 2020-09-02 NOTE — LETTER
September 2, 2020     [address]    Patient: Komal Davis   YOB: 1965   Date of Visit: 9/2/2020       Dear Dr Miguel Edge: Thank you for referring Komal Davis to me for evaluation  Below are my notes for this consultation  If you have questions, please do not hesitate to call me  I look forward to following your patient along with you  Sincerely,        Yudelka Doyle MD        CC: No Recipients  Yudelka Doyle MD  9/2/2020 10:17 AM  Incomplete  NEPHROLOGY PROGRESS NOTE    Patient: Komal Davis               Sex: male          DOA: No admission date for patient encounter  YOB: 1965        Age:  47 y o         LOS: [unfilled]  9/2/2020        BACKGROUND     48 y o  M with PMH of HIV disease, cryptococcal meningitis, CKD III who has been following up in Renal clinic since at least 2017  SUBJECTIVE      Patient following up after 4 months  Last visit was via telemedicine  States that he is in good held and denies any chest pain, shortness of breath, blood in the urine, abdominal pain, lower extremity edema  Denies any further low blood pressure episodes  REVIEW OF SYSTEMS     Review of Systems   Constitutional: Negative  HENT: Negative  Eyes: Negative  Respiratory: Negative  Cardiovascular: Negative  Gastrointestinal: Negative  Endocrine: Negative  Genitourinary: Negative  Musculoskeletal: Negative  Skin: Negative  Allergic/Immunologic: Negative  Neurological: Negative  Hematological: Negative  All other systems reviewed and are negative  OBJECTIVE     Current Weight: Weight - Scale: 65 4 kg (144 lb 3 2 oz)  Vitals:    09/02/20 0959   BP: 108/78   Pulse: 62   Temp: (!) 97 4 °F (36 3 °C)     Body mass index is 22 58 kg/m²    [unfilled]    CURRENT MEDICATIONS       Current Outpatient Medications:     bictegravir-emtricitab-tenofovir alafenamide (BIKTARVY) -25 MG tablet, Take by mouth, Disp: , Rfl:     lisinopril (ZESTRIL) 5 mg tablet, Take 1 tablet (5 mg total) by mouth daily, Disp: 90 tablet, Rfl: 3    pravastatin (PRAVACHOL) 20 mg tablet, , Disp: , Rfl:       PHYSICAL EXAMINATION     Physical Exam  HENT:      Head: Normocephalic and atraumatic  Eyes:      Pupils: Pupils are equal, round, and reactive to light  Neck:      Musculoskeletal: Neck supple  Vascular: No JVD  Cardiovascular:      Rate and Rhythm: Normal rate and regular rhythm  Heart sounds: Normal heart sounds  No murmur  No friction rub  Pulmonary:      Effort: Pulmonary effort is normal       Breath sounds: Normal breath sounds  Abdominal:      General: Bowel sounds are normal  There is no distension  Palpations: Abdomen is soft  Tenderness: There is no abdominal tenderness  There is no rebound  Musculoskeletal:         General: No tenderness  Skin:     General: Skin is dry  Findings: No rash  Neurological:      Mental Status: He is alert and oriented to person, place, and time  LAB RESULTS     Results from last 6 Months   Lab Units 08/24/20  0933 08/24/20  0932 04/13/20  1418   WBC Thousand/uL 6 47  --  7 48   HEMOGLOBIN g/dL 17 2*  --  17 3*   HEMATOCRIT % 50 4*  --  50 3*   PLATELETS Thousands/uL 227  --  250   POTASSIUM mmol/L  --  4 2 3 8   CHLORIDE mmol/L  --  101 101   CO2 mmol/L  --  28 28   BUN mg/dL  --  16 14   CREATININE mg/dL  --  1 69* 1 71*   CALCIUM mg/dL  --  8 8 8 6   PHOSPHORUS mg/dL  --  2 4* 2 5*             RADIOLOGY RESULTS      Reviewed  ASSESSMENT/PLAN     48 M with PMH of CKD III, cryptococcal meningitis, HIV disease who presents to Renal clinic for f/u     1) CKD III: 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, HAART therapy that may have caused kidney dysfunction  Baseline S Cr has been 1 6 since at least 2017  Labs performed revealed S Cr of 1 6 and stable   Renal US revealed normal sized kidneys    -avoidance of NSAIDs, contrast agents reiterated  2) Proteinuria: Noted to have 290 mg of proteinuria per UPC criteria  Target is less than 110 milligrams/gram of creatinine per UPC criteria  On lisinopril 2 5 mg once daily  3) secondary hyperparathyroidism of renal origin:  Parathyroid hormone intact is on target  Noted vitamin-D levels to be sufficient  Hypophosphatemia persists and hence recommended patient to take vitamin D3 on daily basis  Normal calcium levels  4) Anemia due to CKD stage 3: Hb is 17 and above target  5) Nutrition: Moderate potassium, low to moderate protein intake up to 40 g recommended  6  HIV:  On HAART therapy with low viral load and normal T cells  Follow-up in 6 months              Amalia Smalls MD  Nephrology  9/2/2020

## 2021-02-23 ENCOUNTER — TELEPHONE (OUTPATIENT)
Dept: NEPHROLOGY | Facility: CLINIC | Age: 56
End: 2021-02-23

## 2021-02-24 ENCOUNTER — LAB (OUTPATIENT)
Dept: LAB | Facility: HOSPITAL | Age: 56
End: 2021-02-24
Attending: INTERNAL MEDICINE
Payer: COMMERCIAL

## 2021-02-24 LAB
25(OH)D3 SERPL-MCNC: 20.6 NG/ML (ref 30–100)
ALBUMIN SERPL BCP-MCNC: 3.8 G/DL (ref 3.5–5)
ALP SERPL-CCNC: 77 U/L (ref 46–116)
ALT SERPL W P-5'-P-CCNC: 39 U/L (ref 12–78)
ANION GAP SERPL CALCULATED.3IONS-SCNC: 7 MMOL/L (ref 4–13)
AST SERPL W P-5'-P-CCNC: 23 U/L (ref 5–45)
BACTERIA UR QL AUTO: ABNORMAL /HPF
BASOPHILS # BLD AUTO: 0.05 THOUSANDS/ΜL (ref 0–0.1)
BASOPHILS NFR BLD AUTO: 1 % (ref 0–1)
BILIRUB SERPL-MCNC: 0.6 MG/DL (ref 0.2–1)
BILIRUB UR QL STRIP: NEGATIVE
BUN SERPL-MCNC: 18 MG/DL (ref 5–25)
CALCIUM SERPL-MCNC: 9.2 MG/DL (ref 8.3–10.1)
CHLORIDE SERPL-SCNC: 102 MMOL/L (ref 100–108)
CLARITY UR: CLEAR
CO2 SERPL-SCNC: 28 MMOL/L (ref 21–32)
COLOR UR: ABNORMAL
CREAT SERPL-MCNC: 1.61 MG/DL (ref 0.6–1.3)
CREAT UR-MCNC: 85.5 MG/DL
EOSINOPHIL # BLD AUTO: 0.07 THOUSAND/ΜL (ref 0–0.61)
EOSINOPHIL NFR BLD AUTO: 1 % (ref 0–6)
ERYTHROCYTE [DISTWIDTH] IN BLOOD BY AUTOMATED COUNT: 12.3 % (ref 11.6–15.1)
GFR SERPL CREATININE-BSD FRML MDRD: 47 ML/MIN/1.73SQ M
GLUCOSE P FAST SERPL-MCNC: 88 MG/DL (ref 65–99)
GLUCOSE UR STRIP-MCNC: NEGATIVE MG/DL
HCT VFR BLD AUTO: 49.4 % (ref 36.5–49.3)
HGB BLD-MCNC: 17 G/DL (ref 12–17)
HGB UR QL STRIP.AUTO: NEGATIVE
IMM GRANULOCYTES # BLD AUTO: 0.01 THOUSAND/UL (ref 0–0.2)
IMM GRANULOCYTES NFR BLD AUTO: 0 % (ref 0–2)
KETONES UR STRIP-MCNC: NEGATIVE MG/DL
LEUKOCYTE ESTERASE UR QL STRIP: NEGATIVE
LYMPHOCYTES # BLD AUTO: 2.56 THOUSANDS/ΜL (ref 0.6–4.47)
LYMPHOCYTES NFR BLD AUTO: 42 % (ref 14–44)
MCH RBC QN AUTO: 31.5 PG (ref 26.8–34.3)
MCHC RBC AUTO-ENTMCNC: 34.4 G/DL (ref 31.4–37.4)
MCV RBC AUTO: 92 FL (ref 82–98)
MONOCYTES # BLD AUTO: 0.71 THOUSAND/ΜL (ref 0.17–1.22)
MONOCYTES NFR BLD AUTO: 12 % (ref 4–12)
NEUTROPHILS # BLD AUTO: 2.77 THOUSANDS/ΜL (ref 1.85–7.62)
NEUTS SEG NFR BLD AUTO: 44 % (ref 43–75)
NITRITE UR QL STRIP: NEGATIVE
NON-SQ EPI CELLS URNS QL MICRO: ABNORMAL /HPF
NRBC BLD AUTO-RTO: 0 /100 WBCS
PH UR STRIP.AUTO: 6 [PH]
PHOSPHATE SERPL-MCNC: 2.4 MG/DL (ref 2.7–4.5)
PLATELET # BLD AUTO: 233 THOUSANDS/UL (ref 149–390)
PMV BLD AUTO: 10.3 FL (ref 8.9–12.7)
POTASSIUM SERPL-SCNC: 4.5 MMOL/L (ref 3.5–5.3)
PROT SERPL-MCNC: 8.1 G/DL (ref 6.4–8.2)
PROT UR STRIP-MCNC: NEGATIVE MG/DL
PROT UR-MCNC: 33 MG/DL
PROT/CREAT UR: 0.39 MG/G{CREAT} (ref 0–0.1)
PTH-INTACT SERPL-MCNC: 54.7 PG/ML (ref 18.4–80.1)
RBC # BLD AUTO: 5.39 MILLION/UL (ref 3.88–5.62)
RBC #/AREA URNS AUTO: ABNORMAL /HPF
SODIUM SERPL-SCNC: 137 MMOL/L (ref 136–145)
SP GR UR STRIP.AUTO: 1.01 (ref 1–1.03)
UROBILINOGEN UR QL STRIP.AUTO: 0.2 E.U./DL
WBC # BLD AUTO: 6.17 THOUSAND/UL (ref 4.31–10.16)
WBC #/AREA URNS AUTO: ABNORMAL /HPF

## 2021-02-24 PROCEDURE — 83970 ASSAY OF PARATHORMONE: CPT | Performed by: INTERNAL MEDICINE

## 2021-02-24 PROCEDURE — 36415 COLL VENOUS BLD VENIPUNCTURE: CPT | Performed by: INTERNAL MEDICINE

## 2021-02-24 PROCEDURE — 84156 ASSAY OF PROTEIN URINE: CPT | Performed by: INTERNAL MEDICINE

## 2021-02-24 PROCEDURE — 82306 VITAMIN D 25 HYDROXY: CPT | Performed by: INTERNAL MEDICINE

## 2021-02-24 PROCEDURE — 85025 COMPLETE CBC W/AUTO DIFF WBC: CPT | Performed by: INTERNAL MEDICINE

## 2021-02-24 PROCEDURE — 80053 COMPREHEN METABOLIC PANEL: CPT | Performed by: INTERNAL MEDICINE

## 2021-02-24 PROCEDURE — 82570 ASSAY OF URINE CREATININE: CPT | Performed by: INTERNAL MEDICINE

## 2021-02-24 PROCEDURE — 81001 URINALYSIS AUTO W/SCOPE: CPT | Performed by: INTERNAL MEDICINE

## 2021-02-24 PROCEDURE — 84100 ASSAY OF PHOSPHORUS: CPT | Performed by: INTERNAL MEDICINE

## 2021-03-02 ENCOUNTER — TELEPHONE (OUTPATIENT)
Dept: NEPHROLOGY | Facility: CLINIC | Age: 56
End: 2021-03-02

## 2021-03-02 NOTE — TELEPHONE ENCOUNTER
I called Alek Leon @ [phone] and spoke to [facility] () to check eligible for the patient and as of 3/2/2021 the patient has current active coverage as of 1/1/2020  This [employer] Advantage [address] [place] [address] is primary and only insurance on file  The [facility] does not require a referral form PCP Dr Vineet Grimes on file  The ROMY# for his call is: [id number] and the reference number is: [id number]  According to [facility] () PCP Co-Pay: $5, Specialist Co-Pay:$40, ER Co-Pay:$90   Lavern Ramos       Correction: PCP is Dr Светлана Nunez MD and Vineet Grimes is a MARIBELL Ramos,

## 2021-03-03 ENCOUNTER — OFFICE VISIT (OUTPATIENT)
Dept: NEPHROLOGY | Facility: CLINIC | Age: 56
End: 2021-03-03
Payer: COMMERCIAL

## 2021-03-03 VITALS
BODY MASS INDEX: 23.07 KG/M2 | TEMPERATURE: 97.1 F | RESPIRATION RATE: 16 BRPM | WEIGHT: 152.2 LBS | HEART RATE: 65 BPM | DIASTOLIC BLOOD PRESSURE: 84 MMHG | HEIGHT: 68 IN | SYSTOLIC BLOOD PRESSURE: 122 MMHG

## 2021-03-03 DIAGNOSIS — N18.31 STAGE 3A CHRONIC KIDNEY DISEASE (HCC): ICD-10-CM

## 2021-03-03 DIAGNOSIS — R80.1 PERSISTENT PROTEINURIA: Primary | ICD-10-CM

## 2021-03-03 PROCEDURE — 99215 OFFICE O/P EST HI 40 MIN: CPT | Performed by: INTERNAL MEDICINE

## 2021-03-03 RX ORDER — LISINOPRIL 2.5 MG/1
2.5 TABLET ORAL 2 TIMES DAILY
Qty: 180 TABLET | Refills: 5 | Status: SHIPPED | OUTPATIENT
Start: 2021-03-03 | End: 2022-03-21

## 2021-03-03 NOTE — PROGRESS NOTES
NEPHROLOGY PROGRESS NOTE    Patient: Pelon Headley               Sex: male          DOA: No admission date for patient encounter  YOB: 1965        Age:  54 y o         LOS: [unfilled]  3/3/2021        BACKGROUND     48 y o  M with PMH of HIV disease, cryptococcal meningitis, CKD III who has been following up in Renal clinic since at least 2017  SUBJECTIVE      Patient following up after 6 months  Offers no complaints except slight decrease in urine flow  States he is defecating every 3rd day  Denies any dizziness, shortness of breath or chest pain  REVIEW OF SYSTEMS     Review of Systems   Constitutional: Negative  HENT: Negative  Eyes: Negative  Respiratory: Negative  Cardiovascular: Negative  Gastrointestinal: Negative  Endocrine: Negative  Genitourinary: Negative  Musculoskeletal: Negative  Skin: Negative  Allergic/Immunologic: Negative  Neurological: Negative  Hematological: Negative  All other systems reviewed and are negative  OBJECTIVE     Current Weight: Weight - Scale: 69 kg (152 lb 3 2 oz)  Vitals:    03/03/21 1103   BP: 122/84   Pulse: 65   Resp: 16   Temp: (!) 97 1 °F (36 2 °C)     Body mass index is 23 14 kg/m²  [unfilled]    CURRENT MEDICATIONS       Current Outpatient Medications:     bictegravir-emtricitab-tenofovir alafenamide (BIKTARVY) -25 MG tablet, Take by mouth, Disp: , Rfl:     pravastatin (PRAVACHOL) 20 mg tablet, , Disp: , Rfl:     lisinopril (ZESTRIL) 2 5 mg tablet, Take 1 tablet (2 5 mg total) by mouth 2 (two) times a day, Disp: 180 tablet, Rfl: 5      PHYSICAL EXAMINATION     Physical Exam  HENT:      Head: Normocephalic and atraumatic  Eyes:      Pupils: Pupils are equal, round, and reactive to light  Neck:      Musculoskeletal: Neck supple  Vascular: No JVD  Cardiovascular:      Rate and Rhythm: Normal rate and regular rhythm  Heart sounds: Normal heart sounds  No murmur  No friction rub  Pulmonary:      Effort: Pulmonary effort is normal       Breath sounds: Normal breath sounds  Abdominal:      General: Bowel sounds are normal  There is no distension  Palpations: Abdomen is soft  Tenderness: There is no abdominal tenderness  There is no rebound  Musculoskeletal:         General: No tenderness  Skin:     General: Skin is dry  Findings: No rash  Neurological:      Mental Status: He is alert and oriented to person, place, and time  LAB RESULTS     Results from last 6 Months   Lab Units 02/24/21  0933   WBC Thousand/uL 6 17   HEMOGLOBIN g/dL 17 0   HEMATOCRIT % 49 4*   PLATELETS Thousands/uL 233   POTASSIUM mmol/L 4 5   CHLORIDE mmol/L 102   CO2 mmol/L 28   BUN mg/dL 18   CREATININE mg/dL 1 61*   CALCIUM mg/dL 9 2   PHOSPHORUS mg/dL 2 4*             RADIOLOGY RESULTS      Reviewed  ASSESSMENT/PLAN     48 M with PMH of CKD III, cryptococcal meningitis, HIV disease who presents to Renal clinic for f/u     1) CKD III: 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, HAART therapy that may have caused kidney dysfunction  Baseline S Cr has been 1 6 since at least 2017  Labs performed revealed S Cr of 1 61 and stable  Renal US revealed normal sized kidneys    -avoidance of NSAIDs, contrast agents reiterated  2) Proteinuria: Noted to have 390 mg of proteinuria per UPC ratio  Will increase Ace-I dose to 2 5 mg PO bid     3) secondary hyperparathyroidism of renal origin:  Parathyroid hormone intact is on target  Noted vitamin-D levels to be insufficient  Hypophosphatemia persists and hence recommended patient to take two tablets of vitamin D3 on daily basis  Normal calcium levels  4) Anemia due to CKD stage 3: Hb is 17 and above target  5) Nutrition: Moderate potassium, low to moderate protein intake up to 40 g recommended  6  HIV:  On HAART therapy with low viral load and normal T cells  Follow-up in 6 months              Ramesh Bacon MD  Nephrology  3/3/2021

## 2021-03-03 NOTE — LETTER
March 3, 2021     [address] [facility]    Patient: Jens Jewell   YOB: 1965   Date of Visit: 3/3/2021       Dear Dr Monet Cm: Thank you for referring Jens Jewell to me for evaluation  Below are my notes for this consultation  If you have questions, please do not hesitate to call me  I look forward to following your patient along with you  Sincerely,        Wilfred Abraham MD        CC: No Recipients  Wilfred Abraham MD  3/3/2021 11:51 AM  Incomplete  NEPHROLOGY PROGRESS NOTE    Patient: Jens Jewell               Sex: male          DOA: No admission date for patient encounter  YOB: 1965        Age:  54 y o         LOS: [unfilled]  3/3/2021        BACKGROUND     48 y o  M with PMH of HIV disease, cryptococcal meningitis, CKD III who has been following up in Renal clinic since at least 2017  SUBJECTIVE      Patient following up after 6 months  Offers no complaints except slight decrease in urine flow  States he is defecating every 3rd day  Denies any dizziness, shortness of breath or chest pain  REVIEW OF SYSTEMS     Review of Systems   Constitutional: Negative  HENT: Negative  Eyes: Negative  Respiratory: Negative  Cardiovascular: Negative  Gastrointestinal: Negative  Endocrine: Negative  Genitourinary: Negative  Musculoskeletal: Negative  Skin: Negative  Allergic/Immunologic: Negative  Neurological: Negative  Hematological: Negative  All other systems reviewed and are negative  OBJECTIVE     Current Weight: Weight - Scale: 69 kg (152 lb 3 2 oz)  Vitals:    03/03/21 1103   BP: 122/84   Pulse: 65   Resp: 16   Temp: (!) 97 1 °F (36 2 °C)     Body mass index is 23 14 kg/m²    [unfilled]    CURRENT MEDICATIONS       Current Outpatient Medications:     bictegravir-emtricitab-tenofovir alafenamide (BIKTARVY) -25 MG tablet, Take by mouth, Disp: , Rfl:     pravastatin (PRAVACHOL) 20 mg tablet, , Disp: , Rfl:     lisinopril (ZESTRIL) 2 5 mg tablet, Take 1 tablet (2 5 mg total) by mouth 2 (two) times a day, Disp: 180 tablet, Rfl: 5      PHYSICAL EXAMINATION     Physical Exam  HENT:      Head: Normocephalic and atraumatic  Eyes:      Pupils: Pupils are equal, round, and reactive to light  Neck:      Musculoskeletal: Neck supple  Vascular: No JVD  Cardiovascular:      Rate and Rhythm: Normal rate and regular rhythm  Heart sounds: Normal heart sounds  No murmur  No friction rub  Pulmonary:      Effort: Pulmonary effort is normal       Breath sounds: Normal breath sounds  Abdominal:      General: Bowel sounds are normal  There is no distension  Palpations: Abdomen is soft  Tenderness: There is no abdominal tenderness  There is no rebound  Musculoskeletal:         General: No tenderness  Skin:     General: Skin is dry  Findings: No rash  Neurological:      Mental Status: He is alert and oriented to person, place, and time  LAB RESULTS     Results from last 6 Months   Lab Units 02/24/21  0933   WBC Thousand/uL 6 17   HEMOGLOBIN g/dL 17 0   HEMATOCRIT % 49 4*   PLATELETS Thousands/uL 233   POTASSIUM mmol/L 4 5   CHLORIDE mmol/L 102   CO2 mmol/L 28   BUN mg/dL 18   CREATININE mg/dL 1 61*   CALCIUM mg/dL 9 2   PHOSPHORUS mg/dL 2 4*             RADIOLOGY RESULTS      Reviewed  ASSESSMENT/PLAN     48 M with PMH of CKD III, cryptococcal meningitis, HIV disease who presents to Renal clinic for f/u     1) CKD III: 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, HAART therapy that may have caused kidney dysfunction  Baseline S Cr has been 1 6 since at least 2017  Labs performed revealed S Cr of 1 61 and stable  Renal US revealed normal sized kidneys    -avoidance of NSAIDs, contrast agents reiterated  2) Proteinuria: Noted to have 390 mg of proteinuria per UPC ratio   Will increase Ace-I dose to 2 5 mg PO bid     3) secondary hyperparathyroidism of renal origin: Parathyroid hormone intact is on target  Noted vitamin-D levels to be insufficient  Hypophosphatemia persists and hence recommended patient to take two tablets of vitamin D3 on daily basis  Normal calcium levels  4) Anemia due to CKD stage 3: Hb is 17 and above target  5) Nutrition: Moderate potassium, low to moderate protein intake up to 40 g recommended  6  HIV:  On HAART therapy with low viral load and normal T cells  Follow-up in 6 months              Guido Walker MD  Nephrology  3/3/2021

## 2021-07-28 ENCOUNTER — TELEPHONE (OUTPATIENT)
Dept: NEPHROLOGY | Facility: CLINIC | Age: 56
End: 2021-07-28

## 2021-07-28 NOTE — TELEPHONE ENCOUNTER
I called and spoke to the patient and reschedule his appointment from 9/8/2021 6 month follow up to 12/2/2021 because of Dr Tatiana Barrett not in the office  The patient understood and was okay with his new day and time of his appointment   Nura Lerner,

## 2021-11-22 ENCOUNTER — APPOINTMENT (OUTPATIENT)
Dept: LAB | Facility: HOSPITAL | Age: 56
End: 2021-11-22
Payer: COMMERCIAL

## 2021-12-02 ENCOUNTER — OFFICE VISIT (OUTPATIENT)
Dept: NEPHROLOGY | Facility: CLINIC | Age: 56
End: 2021-12-02
Payer: COMMERCIAL

## 2021-12-02 VITALS
TEMPERATURE: 97.5 F | HEART RATE: 91 BPM | WEIGHT: 152 LBS | SYSTOLIC BLOOD PRESSURE: 120 MMHG | BODY MASS INDEX: 23.04 KG/M2 | HEIGHT: 68 IN | DIASTOLIC BLOOD PRESSURE: 90 MMHG | RESPIRATION RATE: 16 BRPM

## 2021-12-02 DIAGNOSIS — N18.31 STAGE 3A CHRONIC KIDNEY DISEASE (HCC): Primary | ICD-10-CM

## 2021-12-02 PROCEDURE — 99215 OFFICE O/P EST HI 40 MIN: CPT | Performed by: INTERNAL MEDICINE

## 2022-03-18 ENCOUNTER — TELEPHONE (OUTPATIENT)
Dept: NEPHROLOGY | Facility: CLINIC | Age: 57
End: 2022-03-18

## 2022-07-05 ENCOUNTER — APPOINTMENT (OUTPATIENT)
Dept: LAB | Facility: HOSPITAL | Age: 57
End: 2022-07-05
Payer: COMMERCIAL

## 2022-07-05 LAB
25(OH)D3 SERPL-MCNC: 128.1 NG/ML (ref 30–100)
ALBUMIN SERPL BCP-MCNC: 3.7 G/DL (ref 3.5–5)
ALP SERPL-CCNC: 104 U/L (ref 46–116)
ALT SERPL W P-5'-P-CCNC: 27 U/L (ref 12–78)
ANION GAP SERPL CALCULATED.3IONS-SCNC: 8 MMOL/L (ref 4–13)
AST SERPL W P-5'-P-CCNC: 17 U/L (ref 5–45)
BACTERIA UR QL AUTO: NORMAL /HPF
BASOPHILS # BLD AUTO: 0.02 THOUSANDS/ΜL (ref 0–0.1)
BASOPHILS NFR BLD AUTO: 0 % (ref 0–1)
BILIRUB SERPL-MCNC: 0.42 MG/DL (ref 0.2–1)
BILIRUB UR QL STRIP: NEGATIVE
BUN SERPL-MCNC: 23 MG/DL (ref 5–25)
CALCIUM SERPL-MCNC: 9.1 MG/DL (ref 8.3–10.1)
CHLORIDE SERPL-SCNC: 101 MMOL/L (ref 100–108)
CLARITY UR: CLEAR
CO2 SERPL-SCNC: 28 MMOL/L (ref 21–32)
COLOR UR: YELLOW
CREAT SERPL-MCNC: 1.69 MG/DL (ref 0.6–1.3)
EOSINOPHIL # BLD AUTO: 0.19 THOUSAND/ΜL (ref 0–0.61)
EOSINOPHIL NFR BLD AUTO: 2 % (ref 0–6)
ERYTHROCYTE [DISTWIDTH] IN BLOOD BY AUTOMATED COUNT: 12.6 % (ref 11.6–15.1)
GFR SERPL CREATININE-BSD FRML MDRD: 44 ML/MIN/1.73SQ M
GLUCOSE P FAST SERPL-MCNC: 101 MG/DL (ref 65–99)
GLUCOSE UR STRIP-MCNC: NEGATIVE MG/DL
HCT VFR BLD AUTO: 46.7 % (ref 36.5–49.3)
HGB BLD-MCNC: 16.2 G/DL (ref 12–17)
HGB UR QL STRIP.AUTO: NEGATIVE
IMM GRANULOCYTES # BLD AUTO: 0.03 THOUSAND/UL (ref 0–0.2)
IMM GRANULOCYTES NFR BLD AUTO: 0 % (ref 0–2)
KETONES UR STRIP-MCNC: NEGATIVE MG/DL
LEUKOCYTE ESTERASE UR QL STRIP: NEGATIVE
LYMPHOCYTES # BLD AUTO: 2.48 THOUSANDS/ΜL (ref 0.6–4.47)
LYMPHOCYTES NFR BLD AUTO: 30 % (ref 14–44)
MCH RBC QN AUTO: 32.3 PG (ref 26.8–34.3)
MCHC RBC AUTO-ENTMCNC: 34.7 G/DL (ref 31.4–37.4)
MCV RBC AUTO: 93 FL (ref 82–98)
MONOCYTES # BLD AUTO: 0.82 THOUSAND/ΜL (ref 0.17–1.22)
MONOCYTES NFR BLD AUTO: 10 % (ref 4–12)
NEUTROPHILS # BLD AUTO: 4.64 THOUSANDS/ΜL (ref 1.85–7.62)
NEUTS SEG NFR BLD AUTO: 58 % (ref 43–75)
NITRITE UR QL STRIP: NEGATIVE
NON-SQ EPI CELLS URNS QL MICRO: NORMAL /HPF
NRBC BLD AUTO-RTO: 0 /100 WBCS
PH UR STRIP.AUTO: 6 [PH]
PHOSPHATE SERPL-MCNC: 2.8 MG/DL (ref 2.7–4.5)
PLATELET # BLD AUTO: 227 THOUSANDS/UL (ref 149–390)
PMV BLD AUTO: 10.1 FL (ref 8.9–12.7)
POTASSIUM SERPL-SCNC: 4.4 MMOL/L (ref 3.5–5.3)
PROT SERPL-MCNC: 8 G/DL (ref 6.4–8.2)
PROT UR STRIP-MCNC: NEGATIVE MG/DL
PTH-INTACT SERPL-MCNC: 40 PG/ML (ref 18.4–80.1)
RBC # BLD AUTO: 5.01 MILLION/UL (ref 3.88–5.62)
RBC #/AREA URNS AUTO: NORMAL /HPF
SODIUM SERPL-SCNC: 137 MMOL/L (ref 136–145)
SP GR UR STRIP.AUTO: 1.02 (ref 1–1.03)
UROBILINOGEN UR QL STRIP.AUTO: 0.2 E.U./DL
WBC # BLD AUTO: 8.18 THOUSAND/UL (ref 4.31–10.16)
WBC #/AREA URNS AUTO: NORMAL /HPF

## 2022-07-05 PROCEDURE — 80053 COMPREHEN METABOLIC PANEL: CPT | Performed by: INTERNAL MEDICINE

## 2022-07-05 PROCEDURE — 84156 ASSAY OF PROTEIN URINE: CPT | Performed by: INTERNAL MEDICINE

## 2022-07-05 PROCEDURE — 85025 COMPLETE CBC W/AUTO DIFF WBC: CPT | Performed by: INTERNAL MEDICINE

## 2022-07-05 PROCEDURE — 83970 ASSAY OF PARATHORMONE: CPT | Performed by: INTERNAL MEDICINE

## 2022-07-05 PROCEDURE — 82570 ASSAY OF URINE CREATININE: CPT | Performed by: INTERNAL MEDICINE

## 2022-07-05 PROCEDURE — 82306 VITAMIN D 25 HYDROXY: CPT | Performed by: INTERNAL MEDICINE

## 2022-07-05 PROCEDURE — 36415 COLL VENOUS BLD VENIPUNCTURE: CPT | Performed by: INTERNAL MEDICINE

## 2022-07-05 PROCEDURE — 84100 ASSAY OF PHOSPHORUS: CPT | Performed by: INTERNAL MEDICINE

## 2022-07-05 PROCEDURE — 81001 URINALYSIS AUTO W/SCOPE: CPT | Performed by: INTERNAL MEDICINE

## 2022-07-06 LAB
CREAT UR-MCNC: 93.7 MG/DL
PROT UR-MCNC: 36 MG/DL
PROT/CREAT UR: 0.38 MG/G{CREAT} (ref 0–0.1)

## 2022-07-13 ENCOUNTER — OFFICE VISIT (OUTPATIENT)
Dept: NEPHROLOGY | Facility: CLINIC | Age: 57
End: 2022-07-13
Payer: COMMERCIAL

## 2022-07-13 VITALS
WEIGHT: 151 LBS | DIASTOLIC BLOOD PRESSURE: 80 MMHG | BODY MASS INDEX: 22.88 KG/M2 | HEIGHT: 68 IN | OXYGEN SATURATION: 97 % | RESPIRATION RATE: 16 BRPM | HEART RATE: 63 BPM | SYSTOLIC BLOOD PRESSURE: 112 MMHG | TEMPERATURE: 97.6 F

## 2022-07-13 DIAGNOSIS — N18.32 STAGE 3B CHRONIC KIDNEY DISEASE (HCC): Primary | ICD-10-CM

## 2022-07-13 PROCEDURE — 99214 OFFICE O/P EST MOD 30 MIN: CPT | Performed by: INTERNAL MEDICINE

## 2022-07-13 RX ORDER — HYDROCORTISONE 25 MG/G
CREAM TOPICAL
COMMUNITY
Start: 2022-07-06

## 2022-07-13 NOTE — LETTER
July 13, 2022     [id number]    Patient: Natalia Persaud   YOB: 1965   Date of Visit: 7/13/2022       Dear Dr Sheyla Samaniego: Thank you for referring Natalia Persaud to me for evaluation  Below are my notes for this consultation  If you have questions, please do not hesitate to call me  I look forward to following your patient along with you  Sincerely,        Negrito Mendoza MD        CC: No Recipients  Negrito Mendoza MD  7/13/2022 11:20 AM  Sign when Signing Visit  NEPHROLOGY PROGRESS NOTE    Patient: Natalia Persaud               Sex: male          DOA: No admission date for patient encounter  YOB: 1965        Age:  64 y o           7/13/2022        BACKGROUND     48 y o  M with PMH of HIV disease, cryptococcal meningitis, CKD III who has been following up in Renal clinic since at least 2017  SUBJECTIVE      Patient following up after 6 months  No complaints  REVIEW OF SYSTEMS     Review of Systems   Constitutional: Negative  HENT: Negative  Eyes: Negative  Respiratory: Negative  Cardiovascular: Negative  Gastrointestinal: Negative  Endocrine: Negative  Genitourinary: Negative  Musculoskeletal: Negative  Skin: Negative  Allergic/Immunologic: Negative  Neurological: Negative  Hematological: Negative  All other systems reviewed and are negative  OBJECTIVE     Current Weight: Weight - Scale: 68 5 kg (151 lb)  Vitals:    07/13/22 1058   BP: 112/80   Pulse: 63   Resp: 16   Temp: 97 6 °F (36 4 °C)   SpO2: 97%     Body mass index is 23 3 kg/m²        CURRENT MEDICATIONS       Current Outpatient Medications:     bictegravir-emtricitab-tenofovir alafenamide (BIKTARVY) -25 MG tablet, Take by mouth, Disp: , Rfl:     Cholecalciferol (DIALYVITE VITAMIN D 5000 PO), Take by mouth Once in the morning once at night , Disp: , Rfl:     hydrocortisone (ANUSOL-HC) 2 5 % rectal cream, , Disp: , Rfl:    lisinopril (ZESTRIL) 2 5 mg tablet, TAKE 1 TABLET TWICE A DAY, Disp: 180 tablet, Rfl: 0    pravastatin (PRAVACHOL) 20 mg tablet, , Disp: , Rfl:       PHYSICAL EXAMINATION     Physical Exam  HENT:      Head: Normocephalic and atraumatic  Eyes:      Pupils: Pupils are equal, round, and reactive to light  Neck:      Vascular: No JVD  Cardiovascular:      Rate and Rhythm: Normal rate and regular rhythm  Heart sounds: Normal heart sounds  No murmur heard  No friction rub  Pulmonary:      Effort: Pulmonary effort is normal       Breath sounds: Normal breath sounds  Abdominal:      General: Bowel sounds are normal  There is no distension  Palpations: Abdomen is soft  Tenderness: There is no abdominal tenderness  There is no rebound  Musculoskeletal:         General: No tenderness  Cervical back: Neck supple  Skin:     General: Skin is dry  Findings: No rash  Neurological:      Mental Status: He is alert and oriented to person, place, and time  LAB RESULTS     Results from last 6 Months   Lab Units 07/05/22  0923   WBC Thousand/uL 8 18   HEMOGLOBIN g/dL 16 2   HEMATOCRIT % 46 7   PLATELETS Thousands/uL 227   POTASSIUM mmol/L 4 4   CHLORIDE mmol/L 101   CO2 mmol/L 28   BUN mg/dL 23   CREATININE mg/dL 1 69*   CALCIUM mg/dL 9 1   PHOSPHORUS mg/dL 2 8             RADIOLOGY RESULTS      Reviewed  ASSESSMENT/PLAN     48 M with PMH of CKD III, cryptococcal meningitis, HIV disease who presents to Renal clinic for f/u     1) CKD IIIb: 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, HAART therapy that may have caused kidney dysfunction  Baseline S Cr has been 1 5-1 6 since at least 2017  Labs performed revealed S Cr of 1 69 mg/dl with eGFR of 44 and stable  Renal US revealed normal sized kidneys    -avoidance of NSAIDs, contrast agents reiterated  2) Proteinuria: 390 mg noted while on Lisinopril 2 5 mg PO bid   Patient would attempt taking Lisinopril thrice daily and assess his BP   Urine Pr: Cr in 6 months  3) secondary hyperparathyroidism of renal origin: Vitamin D is in toxic range  Asked to take Vitamin D 1 tablet every 48 hours  4) Anemia due to CKD stage 3: Hb is 16 g/dl and above target  5) Nutrition: Moderate potassium, low to moderate protein intake up to 40 g recommended  6  HIV:  On HAART therapy with low viral load and normal T cells  Follow-up in 6 months              Chad Henry MD  Nephrology  7/13/2022

## 2022-07-13 NOTE — PROGRESS NOTES
NEPHROLOGY PROGRESS NOTE    Patient: Hanna Vogel               Sex: male          DOA: No admission date for patient encounter  YOB: 1965        Age:  64 y o           7/13/2022        BACKGROUND     48 y o  M with PMH of HIV disease, cryptococcal meningitis, CKD III who has been following up in Renal clinic since at least 2017  SUBJECTIVE      Patient following up after 6 months  No complaints  REVIEW OF SYSTEMS     Review of Systems   Constitutional: Negative  HENT: Negative  Eyes: Negative  Respiratory: Negative  Cardiovascular: Negative  Gastrointestinal: Negative  Endocrine: Negative  Genitourinary: Negative  Musculoskeletal: Negative  Skin: Negative  Allergic/Immunologic: Negative  Neurological: Negative  Hematological: Negative  All other systems reviewed and are negative  OBJECTIVE     Current Weight: Weight - Scale: 68 5 kg (151 lb)  Vitals:    07/13/22 1058   BP: 112/80   Pulse: 63   Resp: 16   Temp: 97 6 °F (36 4 °C)   SpO2: 97%     Body mass index is 23 3 kg/m²  CURRENT MEDICATIONS       Current Outpatient Medications:     bictegravir-emtricitab-tenofovir alafenamide (BIKTARVY) -25 MG tablet, Take by mouth, Disp: , Rfl:     Cholecalciferol (DIALYVITE VITAMIN D 5000 PO), Take by mouth Once in the morning once at night , Disp: , Rfl:     hydrocortisone (ANUSOL-HC) 2 5 % rectal cream, , Disp: , Rfl:     lisinopril (ZESTRIL) 2 5 mg tablet, TAKE 1 TABLET TWICE A DAY, Disp: 180 tablet, Rfl: 0    pravastatin (PRAVACHOL) 20 mg tablet, , Disp: , Rfl:       PHYSICAL EXAMINATION     Physical Exam  HENT:      Head: Normocephalic and atraumatic  Eyes:      Pupils: Pupils are equal, round, and reactive to light  Neck:      Vascular: No JVD  Cardiovascular:      Rate and Rhythm: Normal rate and regular rhythm  Heart sounds: Normal heart sounds  No murmur heard  No friction rub     Pulmonary:      Effort: Pulmonary effort is normal       Breath sounds: Normal breath sounds  Abdominal:      General: Bowel sounds are normal  There is no distension  Palpations: Abdomen is soft  Tenderness: There is no abdominal tenderness  There is no rebound  Musculoskeletal:         General: No tenderness  Cervical back: Neck supple  Skin:     General: Skin is dry  Findings: No rash  Neurological:      Mental Status: He is alert and oriented to person, place, and time  LAB RESULTS     Results from last 6 Months   Lab Units 07/05/22  0923   WBC Thousand/uL 8 18   HEMOGLOBIN g/dL 16 2   HEMATOCRIT % 46 7   PLATELETS Thousands/uL 227   POTASSIUM mmol/L 4 4   CHLORIDE mmol/L 101   CO2 mmol/L 28   BUN mg/dL 23   CREATININE mg/dL 1 69*   CALCIUM mg/dL 9 1   PHOSPHORUS mg/dL 2 8             RADIOLOGY RESULTS      Reviewed  ASSESSMENT/PLAN     48 M with PMH of CKD III, cryptococcal meningitis, HIV disease who presents to Renal clinic for f/u     1) CKD IIIb: 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, HAART therapy that may have caused kidney dysfunction  Baseline S Cr has been 1 5-1 6 since at least 2017  Labs performed revealed S Cr of 1 69 mg/dl with eGFR of 44 and stable  Renal US revealed normal sized kidneys    -avoidance of NSAIDs, contrast agents reiterated  2) Proteinuria: 390 mg noted while on Lisinopril 2 5 mg PO bid  Patient would attempt taking Lisinopril thrice daily and assess his BP  Urine Pr: Cr in 6 months  3) secondary hyperparathyroidism of renal origin: Vitamin D is in toxic range  Asked to take Vitamin D 1 tablet every 48 hours  4) Anemia due to CKD stage 3: Hb is 16 g/dl and above target  5) Nutrition: Moderate potassium, low to moderate protein intake up to 40 g recommended  6  HIV:  On HAART therapy with low viral load and normal T cells  Follow-up in 6 months              Patience Barragan MD  Nephrology  7/13/2022

## 2022-08-21 ENCOUNTER — APPOINTMENT (OUTPATIENT)
Dept: RADIOLOGY | Facility: HOSPITAL | Age: 57
End: 2022-08-21
Payer: COMMERCIAL

## 2022-08-21 ENCOUNTER — HOSPITAL ENCOUNTER (EMERGENCY)
Facility: HOSPITAL | Age: 57
Discharge: HOME | End: 2022-08-21
Attending: EMERGENCY MEDICINE | Admitting: EMERGENCY MEDICINE
Payer: COMMERCIAL

## 2022-08-21 VITALS
WEIGHT: 145 LBS | TEMPERATURE: 98 F | OXYGEN SATURATION: 98 % | HEART RATE: 66 BPM | SYSTOLIC BLOOD PRESSURE: 117 MMHG | HEIGHT: 68 IN | DIASTOLIC BLOOD PRESSURE: 84 MMHG | RESPIRATION RATE: 17 BRPM | BODY MASS INDEX: 21.98 KG/M2

## 2022-08-21 DIAGNOSIS — M54.2 SPINE PAIN, CERVICAL: Primary | ICD-10-CM

## 2022-08-21 PROCEDURE — 99283 EMERGENCY DEPT VISIT LOW MDM: CPT

## 2022-08-21 PROCEDURE — 72040 X-RAY EXAM NECK SPINE 2-3 VW: CPT

## 2022-08-21 PROCEDURE — 99284 EMERGENCY DEPT VISIT MOD MDM: CPT | Performed by: NURSE PRACTITIONER

## 2022-08-21 RX ORDER — HYDROCODONE BITARTRATE AND ACETAMINOPHEN 5; 325 MG/1; MG/1
1 TABLET ORAL EVERY 6 HOURS PRN
Qty: 20 TABLET | Refills: 0 | Status: SHIPPED | OUTPATIENT
Start: 2022-08-21

## 2022-08-21 RX ORDER — MELOXICAM 15 MG/1
15 TABLET ORAL DAILY
Qty: 30 TABLET | Refills: 0 | Status: SHIPPED | OUTPATIENT
Start: 2022-08-21 | End: 2022-09-20

## 2022-08-21 NOTE — ED PROVIDER NOTES
History  Chief Complaint   Patient presents with    Neck Pain     Pt c/o neck pain x 4weeks, pt denies any injury     Is a 70-year-old male patient presents here with neck pain  He reports he has chronic neck pain and chronic low back pain but he reports over the last 4 weeks his neck pain has increased significantly  He cannot elicit a specific injury or difficult night of sleeping but since that time he has pain when he turns his head to the right and to the left, when he holds it straight, when he sleeps at night  He denies fever chills  There is no radiculopathy  Neck Pain  Quality:  Aching  Pain radiates to:  Does not radiate  Pain severity:  Moderate  Associated symptoms: no chest pain, no fever, no headaches and no photophobia        Prior to Admission Medications   Prescriptions Last Dose Informant Patient Reported? Taking? Cholecalciferol (DIALYVITE VITAMIN D 5000 PO)  Self Yes No   Sig: Take by mouth Once in the morning once at night    bictegravir-emtricitab-tenofovir alafenamide (BIKTARVY) -25 MG tablet  Self Yes No   Sig: Take by mouth   hydrocortisone (ANUSOL-HC) 2 5 % rectal cream  Self Yes No   lisinopril (ZESTRIL) 2 5 mg tablet  Self No No   Sig: TAKE 1 TABLET TWICE A DAY   pravastatin (PRAVACHOL) 20 mg tablet  Self Yes No      Facility-Administered Medications: None       Past Medical History:   Diagnosis Date    Chronic kidney disease     HIV (human immunodeficiency virus infection) ([facility] [address] )     Hyperlipidemia     Lyme disease        Past Surgical History:   Procedure Laterality Date    CYST REMOVAL         Family History   Problem Relation Age of Onset    Hypertension Mother     No Known Problems Father      I have reviewed and agree with the history as documented      E-Cigarette/Vaping     E-Cigarette/Vaping Substances     Social History     Tobacco Use    Smoking status: Never Smoker    Smokeless tobacco: Never Used   Substance Use Topics    Alcohol use: No    Drug use: No       Review of Systems   Constitutional: Negative for diaphoresis, fatigue and fever  HENT: Negative for congestion, ear pain, nosebleeds and sore throat  Eyes: Negative for photophobia, pain, discharge and visual disturbance  Respiratory: Negative for cough, choking, chest tightness, shortness of breath and wheezing  Cardiovascular: Negative for chest pain and palpitations  Gastrointestinal: Negative for abdominal distention, abdominal pain, diarrhea and vomiting  Genitourinary: Negative for dysuria, flank pain and frequency  Musculoskeletal: Positive for neck pain  Negative for back pain, gait problem and joint swelling  Skin: Negative for color change and rash  Neurological: Negative for dizziness, syncope and headaches  Psychiatric/Behavioral: Negative for behavioral problems and confusion  The patient is not nervous/anxious  All other systems reviewed and are negative  Physical Exam  Physical Exam  Vitals and nursing note reviewed  Constitutional:       General: He is not in acute distress  Appearance: He is well-developed  HENT:      Head: Normocephalic and atraumatic  Eyes:      General:         Right eye: No discharge  Left eye: No discharge  Conjunctiva/sclera: Conjunctivae normal    Cardiovascular:      Rate and Rhythm: Normal rate  Pulmonary:      Effort: Pulmonary effort is normal  No respiratory distress  Abdominal:      General: There is no distension  Tenderness: There is no guarding  Musculoskeletal:         General: No deformity  Cervical back: Normal range of motion and neck supple  Spasms and tenderness present  Skin:     General: Skin is warm and dry  Neurological:      Mental Status: He is alert and oriented to person, place, and time        Coordination: Coordination normal          Vital Signs  ED Triage Vitals [08/21/22 1201]   Temperature Pulse Respirations Blood Pressure SpO2   98 °F (36 7 °C) 66 17 117/84 98 % Temp Source Heart Rate Source Patient Position - Orthostatic VS BP Location FiO2 (%)   Oral Monitor Sitting Left arm --      Pain Score       5           Vitals:    08/21/22 1201   BP: 117/84   Pulse: 66   Patient Position - Orthostatic VS: Sitting         Visual Acuity      ED Medications  Medications - No data to display    Diagnostic Studies  Results Reviewed     None                 XR spine cervical 2 or 3 vw injury    (Results Pending)              Procedures  Procedures         ED Course                                             MDM  Number of Diagnoses or Management Options  Spine pain, cervical: new and requires workup  Diagnosis management comments: Acute exacerbation of chronic neck pain  Likely will benefit from evaluation by the Comprehensive Sp[facility] program       Amount and/or Complexity of Data Reviewed  Independent visualization of images, tracings, or specimens: yes    Patient Progress  Patient progress: stable      Disposition  Final diagnoses:   Spine pain, cervical - Acute exacerbation of chronic cervical spine pain     Time reflects when diagnosis was documented in both MDM as applicable and the Disposition within this note     Time User Action Codes Description Comment    8/21/2022  3:30 PM Lucas Alvarez Add [M54 2] Spine pain, cervical     8/21/2022  3:30 PM Lucas Alvarez Modify [M54 2] Spine pain, cervical Acute exacerbation of chronic cervical spine pain      ED Disposition     ED Disposition   Discharge    Condition   Stable    Date/Time   Sun Aug 21, 2022  3:29 PM    Comment   Kody Hart discharge to home/self care                 Follow-up Information     Follow up With Specialties Details Why Contact Info Additional Information    [facility] Physical Therapy Go to   [phone]          Discharge Medication List as of 8/21/2022  4:05 PM      START taking these medications    Details   HYDROcodone-acetaminophen (NORCO) 5-325 mg per tablet Take 1 tablet by mouth every 6 (six) hours as needed for pain for up to 20 doses Max Daily Amount: 4 tablets, Starting Sun 8/21/2022, Normal      meloxicam (Mobic) 15 mg tablet Take 1 tablet (15 mg total) by mouth daily, Starting Sun 8/21/2022, Until Tue 9/20/2022, Normal         CONTINUE these medications which have NOT CHANGED    Details   bictegravir-emtricitab-tenofovir alafenamide (BIKTARVY) -25 MG tablet Take by mouth, Historical Med      Cholecalciferol (DIALYVITE VITAMIN D 5000 PO) Take by mouth Once in the morning once at night , Historical Med      hydrocortisone (ANUSOL-HC) 2 5 % rectal cream Starting Wed 7/6/2022, Historical Med      lisinopril (ZESTRIL) 2 5 mg tablet TAKE 1 TABLET TWICE A DAY, Normal      pravastatin (PRAVACHOL) 20 mg tablet Starting Mon 7/8/2019, Historical Med                 PDMP Review     None          ED Provider  Electronically Signed by           MARIBELL Desouza  08/21/22 3238

## 2022-08-23 ENCOUNTER — TELEPHONE (OUTPATIENT)
Dept: PHYSICAL THERAPY | Facility: OTHER | Age: 57
End: 2022-08-23

## 2022-08-23 NOTE — TELEPHONE ENCOUNTER
Call placed to the patient per [facility] referral     Spoke with the patient, he is feeling a little better  He thinks b/c of the medication  He would like to wait a week, and call [facility] back if needed       Referral closed

## 2022-08-30 ENCOUNTER — NURSE TRIAGE (OUTPATIENT)
Dept: PHYSICAL THERAPY | Facility: OTHER | Age: 57
End: 2022-08-30

## 2022-08-30 DIAGNOSIS — M54.2 ACUTE NECK PAIN: Primary | ICD-10-CM

## 2022-08-30 NOTE — TELEPHONE ENCOUNTER
Additional Information   Negative: Has the patient had unexplained weight loss?  Negative: Does the patient have a fever?  Negative: Is the patient experiencing blood in sputum?  Negative: Is the patient experiencing urine retention?  Negative: Is the patient experiencing acute drop foot or paralysis?  Negative: Has the patient experienced major trauma? (fall from height, high speed collision, direct blow to spine) and is also experiencing nausea, light-headedness, or loss of consciousness?  Affirmative: Is this a chronic condition? Protocols used: SL AMB COMPREHENSIVE SPINE PROGRAM PROTOCOL    This RN did review in detail the Comprehensive [facility] Program and what we can provide for their back pain  Patient is agreeable to being triaged by this RN and would like to proceed with Physical Therapy  Referral was placed for Physical Therapy at the [facility] site  Patients information was sent to the  to make evaluation appointment  Patient made aware that the PT office  will be calling to schedule the appointment  Patient was provided with the phone number to the PT office  No further questions and/or concerns were voiced by the patient at this time  Patient states understanding of the referral that was placed  Referral Closed

## 2022-08-30 NOTE — TELEPHONE ENCOUNTER
Additional Information   Negative: Is this related to a work injury?  Negative: Is this related to an MVA?  Negative: Are you currently recieving homecare services? Background - Initial Assessment  Clinical complaint: Pain is bilat neck pain, radiates downward to upper shoulders  Denies numbness and tingling  Neck pain got worse around 7/24/22  HX of this pain for 30 yrs  Also has low back pain on and off  Was seen ED     Date of onset: 7/24/22  Frequency of pain: intermittent  Quality of pain: throbbing    Protocols used: SL AMB COMPREHENSIVE SPINE PROGRAM PROTOCOL

## 2022-10-20 ENCOUNTER — TELEPHONE (OUTPATIENT)
Dept: NEPHROLOGY | Facility: CLINIC | Age: 57
End: 2022-10-20

## 2022-10-20 NOTE — TELEPHONE ENCOUNTER
I called and spoke to the patient and scheduled his nephrology follow up with Dr Acosta Chandler from our recall list  The patient understood and was okay with the day and time of his next appointment

## 2023-01-10 ENCOUNTER — TELEPHONE (OUTPATIENT)
Dept: NEPHROLOGY | Facility: CLINIC | Age: 58
End: 2023-01-10

## 2023-01-10 ENCOUNTER — APPOINTMENT (OUTPATIENT)
Dept: LAB | Facility: HOSPITAL | Age: 58
End: 2023-01-10

## 2023-01-10 LAB
25(OH)D3 SERPL-MCNC: 37.6 NG/ML (ref 30–100)
ALBUMIN SERPL BCP-MCNC: 3.9 G/DL (ref 3.5–5)
ALP SERPL-CCNC: 101 U/L (ref 46–116)
ALT SERPL W P-5'-P-CCNC: 44 U/L (ref 12–78)
ANION GAP SERPL CALCULATED.3IONS-SCNC: 10 MMOL/L (ref 4–13)
AST SERPL W P-5'-P-CCNC: 29 U/L (ref 5–45)
BASOPHILS # BLD AUTO: 0.02 THOUSANDS/ÂΜL (ref 0–0.1)
BASOPHILS NFR BLD AUTO: 0 % (ref 0–1)
BILIRUB SERPL-MCNC: 0.84 MG/DL (ref 0.2–1)
BUN SERPL-MCNC: 25 MG/DL (ref 5–25)
CALCIUM SERPL-MCNC: 9.2 MG/DL (ref 8.3–10.1)
CHLORIDE SERPL-SCNC: 101 MMOL/L (ref 96–108)
CO2 SERPL-SCNC: 27 MMOL/L (ref 21–32)
CREAT SERPL-MCNC: 1.57 MG/DL (ref 0.6–1.3)
EOSINOPHIL # BLD AUTO: 0.02 THOUSAND/ÂΜL (ref 0–0.61)
EOSINOPHIL NFR BLD AUTO: 0 % (ref 0–6)
ERYTHROCYTE [DISTWIDTH] IN BLOOD BY AUTOMATED COUNT: 12.7 % (ref 11.6–15.1)
GFR SERPL CREATININE-BSD FRML MDRD: 48 ML/MIN/1.73SQ M
GLUCOSE P FAST SERPL-MCNC: 119 MG/DL (ref 65–99)
HCT VFR BLD AUTO: 48.9 % (ref 36.5–49.3)
HGB BLD-MCNC: 17 G/DL (ref 12–17)
IMM GRANULOCYTES # BLD AUTO: 0.05 THOUSAND/UL (ref 0–0.2)
IMM GRANULOCYTES NFR BLD AUTO: 0 % (ref 0–2)
LYMPHOCYTES # BLD AUTO: 0.39 THOUSANDS/ÂΜL (ref 0.6–4.47)
LYMPHOCYTES NFR BLD AUTO: 3 % (ref 14–44)
MCH RBC QN AUTO: 32.3 PG (ref 26.8–34.3)
MCHC RBC AUTO-ENTMCNC: 34.8 G/DL (ref 31.4–37.4)
MCV RBC AUTO: 93 FL (ref 82–98)
MONOCYTES # BLD AUTO: 0.63 THOUSAND/ÂΜL (ref 0.17–1.22)
MONOCYTES NFR BLD AUTO: 5 % (ref 4–12)
NEUTROPHILS # BLD AUTO: 10.96 THOUSANDS/ÂΜL (ref 1.85–7.62)
NEUTS SEG NFR BLD AUTO: 92 % (ref 43–75)
NRBC BLD AUTO-RTO: 0 /100 WBCS
PHOSPHATE SERPL-MCNC: 2.2 MG/DL (ref 2.7–4.5)
PLATELET # BLD AUTO: 212 THOUSANDS/UL (ref 149–390)
PMV BLD AUTO: 10.4 FL (ref 8.9–12.7)
POTASSIUM SERPL-SCNC: 4.5 MMOL/L (ref 3.5–5.3)
PROT SERPL-MCNC: 8.5 G/DL (ref 6.4–8.4)
PTH-INTACT SERPL-MCNC: 54.5 PG/ML (ref 18.4–80.1)
RBC # BLD AUTO: 5.26 MILLION/UL (ref 3.88–5.62)
SODIUM SERPL-SCNC: 138 MMOL/L (ref 135–147)
WBC # BLD AUTO: 12.07 THOUSAND/UL (ref 4.31–10.16)

## 2023-01-10 NOTE — TELEPHONE ENCOUNTER
Called spoke with patient advised patient to get labs done prior to 1/18 appointment with Dr Michaels  patient understood and is okay with it

## 2023-01-11 ENCOUNTER — APPOINTMENT (OUTPATIENT)
Dept: LAB | Facility: HOSPITAL | Age: 58
End: 2023-01-11

## 2023-01-11 LAB
BACTERIA UR QL AUTO: ABNORMAL /HPF
BILIRUB UR QL STRIP: NEGATIVE
CLARITY UR: CLEAR
COLOR UR: ABNORMAL
CREAT UR-MCNC: 82.8 MG/DL
GLUCOSE UR STRIP-MCNC: NEGATIVE MG/DL
HGB UR QL STRIP.AUTO: NEGATIVE
KETONES UR STRIP-MCNC: NEGATIVE MG/DL
LEUKOCYTE ESTERASE UR QL STRIP: NEGATIVE
NITRITE UR QL STRIP: NEGATIVE
NON-SQ EPI CELLS URNS QL MICRO: ABNORMAL /HPF
PH UR STRIP.AUTO: 6 [PH]
PROT UR STRIP-MCNC: ABNORMAL MG/DL
PROT UR-MCNC: 41 MG/DL
PROT/CREAT UR: 0.5 MG/G{CREAT} (ref 0–0.1)
RBC #/AREA URNS AUTO: ABNORMAL /HPF
SP GR UR STRIP.AUTO: 1.01 (ref 1–1.03)
UROBILINOGEN UR STRIP-ACNC: <2 MG/DL
WBC #/AREA URNS AUTO: ABNORMAL /HPF

## 2023-01-17 ENCOUNTER — TELEPHONE (OUTPATIENT)
Dept: NEPHROLOGY | Facility: CLINIC | Age: 58
End: 2023-01-17

## 2023-01-17 NOTE — TELEPHONE ENCOUNTER
Appointment was confirmed with patient and patient understood about the time change in his appointment   Keila Ogden,

## 2023-01-17 NOTE — TELEPHONE ENCOUNTER
Appointment was confirmed with patient and patient also understood about the time change of his appointment    Xander Wheat,

## 2023-01-18 ENCOUNTER — OFFICE VISIT (OUTPATIENT)
Dept: NEPHROLOGY | Facility: CLINIC | Age: 58
End: 2023-01-18

## 2023-01-18 VITALS
OXYGEN SATURATION: 98 % | SYSTOLIC BLOOD PRESSURE: 124 MMHG | BODY MASS INDEX: 22.88 KG/M2 | TEMPERATURE: 97.7 F | DIASTOLIC BLOOD PRESSURE: 90 MMHG | HEIGHT: 68 IN | HEART RATE: 65 BPM | RESPIRATION RATE: 16 BRPM | WEIGHT: 151 LBS

## 2023-01-18 DIAGNOSIS — N18.32 STAGE 3B CHRONIC KIDNEY DISEASE (HCC): Primary | ICD-10-CM

## 2023-01-18 NOTE — LETTER
January 18, 2023     [address] [facility]    Patient: Ellie Rodrigues   YOB: 1965   Date of Visit: 1/18/2023       Dear Dr Fields Scale: Thank you for referring Ellie Rodrigues to me for evaluation  Below are my notes for this consultation  If you have questions, please do not hesitate to call me  I look forward to following your patient along with you  Sincerely,        Rosette Fisher MD        CC: No Recipients  Rosette Fisher MD  1/18/2023 10:22 AM  Incomplete  NEPHROLOGY PROGRESS NOTE    Patient: Ellie Rodrigues               Sex: male          DOA: No admission date for patient encounter  YOB: 1965        Age:  62 y o           1/18/2023        BACKGROUND     48 y o  M with PMH of HIV disease, cryptococcal meningitis, CKD III who has been following up in Renal clinic since at least 2017  SUBJECTIVE      Patient following up after 6 months  States that he has been unable to consistently take 3 tablets of lisinopril 2 5 mg daily  Continues to take [employer]  REVIEW OF SYSTEMS     Review of Systems   Constitutional: Negative  HENT: Negative  Eyes: Negative  Respiratory: Negative  Cardiovascular: Negative  Gastrointestinal: Negative  Endocrine: Negative  Genitourinary: Negative  Musculoskeletal: Negative  Skin: Negative  Allergic/Immunologic: Negative  Neurological: Negative  Hematological: Negative  All other systems reviewed and are negative  OBJECTIVE     Current Weight: Weight - Scale: 68 5 kg (151 lb)  Vitals:    01/18/23 0955   BP: 124/90   Pulse: 65   Resp: 16   Temp: 97 7 °F (36 5 °C)   SpO2: 98%     Body mass index is 22 96 kg/m²        CURRENT MEDICATIONS       Current Outpatient Medications:   •  bictegravir-emtricitab-tenofovir alafenamide (BIKTARVY) -25 MG tablet, Take by mouth, Disp: , Rfl:   •  hydrocortisone (ANUSOL-HC) 2 5 % rectal cream, , Disp: , Rfl:   •  lisinopril (ZESTRIL) 2 5 mg tablet, TAKE 1 TABLET TWICE A DAY, Disp: 180 tablet, Rfl: 0  •  pravastatin (PRAVACHOL) 20 mg tablet, , Disp: , Rfl:   •  Cholecalciferol (DIALYVITE VITAMIN D 5000 PO), Take by mouth Once in the morning once at night  (Patient not taking: Reported on 1/18/2023), Disp: , Rfl:   •  HYDROcodone-acetaminophen (NORCO) 5-325 mg per tablet, Take 1 tablet by mouth every 6 (six) hours as needed for pain for up to 20 doses Max Daily Amount: 4 tablets (Patient not taking: Reported on 1/18/2023), Disp: 20 tablet, Rfl: 0  •  meloxicam (Mobic) 15 mg tablet, Take 1 tablet (15 mg total) by mouth daily (Patient not taking: Reported on 1/18/2023), Disp: 30 tablet, Rfl: 0      PHYSICAL EXAMINATION     Physical Exam  HENT:      Head: Normocephalic and atraumatic  Eyes:      Pupils: Pupils are equal, round, and reactive to light  Neck:      Vascular: No JVD  Cardiovascular:      Rate and Rhythm: Normal rate and regular rhythm  Heart sounds: Normal heart sounds  No murmur heard  No friction rub  Pulmonary:      Effort: Pulmonary effort is normal       Breath sounds: Normal breath sounds  Abdominal:      General: Bowel sounds are normal  There is no distension  Palpations: Abdomen is soft  Tenderness: There is no abdominal tenderness  There is no rebound  Musculoskeletal:         General: No tenderness  Cervical back: Neck supple  Skin:     General: Skin is dry  Findings: No rash  Neurological:      Mental Status: He is alert and oriented to person, place, and time  LAB RESULTS     Results from last 6 Months   Lab Units 01/10/23  1149   WBC Thousand/uL 12 07*   HEMOGLOBIN g/dL 17 0   HEMATOCRIT % 48 9   PLATELETS Thousands/uL 212   POTASSIUM mmol/L 4 5   CHLORIDE mmol/L 101   CO2 mmol/L 27   BUN mg/dL 25   CREATININE mg/dL 1 57*   CALCIUM mg/dL 9 2   PHOSPHORUS mg/dL 2 2*             RADIOLOGY RESULTS      Reviewed      ASSESSMENT/PLAN     48 M with PMH of CKD III, cryptococcal meningitis, HIV disease who presents to Renal clinic for f/u     1) CKD IIIa: 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, HAART therapy that may have caused kidney dysfunction  Baseline S Cr has been 1 5-1 6 since at least 2017  Labs performed revealed S Cr of 1 5 mg/dl with eGFR of 48 and stable  Renal US revealed normal sized kidneys    -avoidance of NSAIDs, contrast agents reiterated  2) Proteinuria: 500 mg of proteinuria noted  Likely tubular proteinuria due to HIV associated tubular damage  On Lisinopril 2 5 mg three tablets daily  3) secondary hyperparathyroidism of renal origin: Vitamin D is within acceptable range at 37  Asked patient to restart taking daily  4) Anemia due to CKD stage 3: Hb is 17 g/dl and above target  5) Nutrition: Moderate potassium, low to moderate protein intake up to 40 g recommended  6  HIV:  On HAART therapy     Follow-up in 6 months              Edward Garsia  Nephrology  1/18/2023

## 2023-01-18 NOTE — PROGRESS NOTES
NEPHROLOGY PROGRESS NOTE    Patient: Savana Finley               Sex: male          DOA: No admission date for patient encounter  YOB: 1965        Age:  62 y o           1/18/2023        BACKGROUND     48 y o  M with PMH of HIV disease, cryptococcal meningitis, CKD III who has been following up in Renal clinic since at least 2017  SUBJECTIVE      Patient following up after 6 months  States that he has been unable to consistently take 3 tablets of lisinopril 2 5 mg daily  Continues to take [employer] [employer]  REVIEW OF SYSTEMS     Review of Systems   Constitutional: Negative  HENT: Negative  Eyes: Negative  Respiratory: Negative  Cardiovascular: Negative  Gastrointestinal: Negative  Endocrine: Negative  Genitourinary: Negative  Musculoskeletal: Negative  Skin: Negative  Allergic/Immunologic: Negative  Neurological: Negative  Hematological: Negative  All other systems reviewed and are negative  OBJECTIVE     Current Weight: Weight - Scale: 68 5 kg (151 lb)  Vitals:    01/18/23 0955   BP: 124/90   Pulse: 65   Resp: 16   Temp: 97 7 °F (36 5 °C)   SpO2: 98%     Body mass index is 22 96 kg/m²        CURRENT MEDICATIONS       Current Outpatient Medications:   •  bictegravir-emtricitab-tenofovir alafenamide (BIKTARVY) -25 MG tablet, Take by mouth, Disp: , Rfl:   •  hydrocortisone (ANUSOL-HC) 2 5 % rectal cream, , Disp: , Rfl:   •  lisinopril (ZESTRIL) 2 5 mg tablet, TAKE 1 TABLET TWICE A DAY, Disp: 180 tablet, Rfl: 0  •  pravastatin (PRAVACHOL) 20 mg tablet, , Disp: , Rfl:   •  Cholecalciferol (DIALYVITE VITAMIN D 5000 PO), Take by mouth Once in the morning once at night  (Patient not taking: Reported on 1/18/2023), Disp: , Rfl:   •  HYDROcodone-acetaminophen (NORCO) 5-325 mg per tablet, Take 1 tablet by mouth every 6 (six) hours as needed for pain for up to 20 doses Max Daily Amount: 4 tablets (Patient not taking: Reported on 1/18/2023), Disp: 20 tablet, Rfl: 0  •  meloxicam (Mobic) 15 mg tablet, Take 1 tablet (15 mg total) by mouth daily (Patient not taking: Reported on 1/18/2023), Disp: 30 tablet, Rfl: 0      PHYSICAL EXAMINATION     Physical Exam  HENT:      Head: Normocephalic and atraumatic  Eyes:      Pupils: Pupils are equal, round, and reactive to light  Neck:      Vascular: No JVD  Cardiovascular:      Rate and Rhythm: Normal rate and regular rhythm  Heart sounds: Normal heart sounds  No murmur heard  No friction rub  Pulmonary:      Effort: Pulmonary effort is normal       Breath sounds: Normal breath sounds  Abdominal:      General: Bowel sounds are normal  There is no distension  Palpations: Abdomen is soft  Tenderness: There is no abdominal tenderness  There is no rebound  Musculoskeletal:         General: No tenderness  Cervical back: Neck supple  Skin:     General: Skin is dry  Findings: No rash  Neurological:      Mental Status: He is alert and oriented to person, place, and time  LAB RESULTS     Results from last 6 Months   Lab Units 01/10/23  1149   WBC Thousand/uL 12 07*   HEMOGLOBIN g/dL 17 0   HEMATOCRIT % 48 9   PLATELETS Thousands/uL 212   POTASSIUM mmol/L 4 5   CHLORIDE mmol/L 101   CO2 mmol/L 27   BUN mg/dL 25   CREATININE mg/dL 1 57*   CALCIUM mg/dL 9 2   PHOSPHORUS mg/dL 2 2*             RADIOLOGY RESULTS      Reviewed  ASSESSMENT/PLAN     48 M with PMH of CKD III, cryptococcal meningitis, HIV disease who presents to Renal clinic for f/u     1) CKD IIIa: 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, HAART therapy that may have caused kidney dysfunction  Baseline S Cr has been 1 5-1 6 since at least 2017  Labs performed revealed S Cr of 1 5 mg/dl with eGFR of 48 and stable  Renal US revealed normal sized kidneys    -avoidance of NSAIDs, contrast agents reiterated  2) Proteinuria: 500 mg of proteinuria noted  Likely tubular proteinuria due to HIV associated tubular damage    On Lisinopril 2 5 mg three tablets daily  3) secondary hyperparathyroidism of renal origin: Vitamin D is within acceptable range at 37  Asked patient to restart taking daily  4) Anemia due to CKD stage 3: Hb is 17 g/dl and above target  5) Nutrition: Moderate potassium, low to moderate protein intake up to 40 g recommended  6  HIV:  On HAART therapy     Follow-up in 6 months              Rosette Fisher  Nephrology  1/18/2023

## 2023-06-18 DIAGNOSIS — R80.1 PERSISTENT PROTEINURIA: ICD-10-CM

## 2023-06-21 ENCOUNTER — TELEPHONE (OUTPATIENT)
Dept: NEPHROLOGY | Facility: CLINIC | Age: 58
End: 2023-06-21

## 2023-06-21 DIAGNOSIS — R80.1 PERSISTENT PROTEINURIA: ICD-10-CM

## 2023-06-21 RX ORDER — LISINOPRIL 2.5 MG/1
2.5 TABLET ORAL 2 TIMES DAILY
Qty: 180 TABLET | Refills: 0 | Status: SHIPPED | OUTPATIENT
Start: 2023-06-21 | End: 2023-09-19

## 2023-06-21 NOTE — TELEPHONE ENCOUNTER
Called spoke with patient advised patient Rx for lisinopril (Zestril)  2 5 mg has been sent in to [facility] pharmacy by on call provider Ale Reinoso  patient understood and is okay with it

## 2023-06-21 NOTE — TELEPHONE ENCOUNTER
Good Morning,      Dr Jacob Heller patient of yours is calling the office for a refill on his  lisinopril (ZESTRIL) 2 5 mg tablet  TAKE 1 TABLET TWICE A DAY  Please send script to  [facility], [address] @ [phone]  As per patient he stated the pharmacy sent an order but there are still waiting for a response      Thank you

## 2023-06-22 RX ORDER — LISINOPRIL 2.5 MG/1
TABLET ORAL
Qty: 180 TABLET | Refills: 0 | Status: SHIPPED | OUTPATIENT
Start: 2023-06-22

## 2023-07-11 ENCOUNTER — TELEPHONE (OUTPATIENT)
Dept: NEPHROLOGY | Facility: CLINIC | Age: 58
End: 2023-07-11

## 2023-07-11 ENCOUNTER — APPOINTMENT (OUTPATIENT)
Dept: LAB | Facility: HOSPITAL | Age: 58
End: 2023-07-11
Payer: COMMERCIAL

## 2023-07-11 LAB
25(OH)D3 SERPL-MCNC: 38.4 NG/ML (ref 30–100)
ALBUMIN SERPL BCP-MCNC: 4.5 G/DL (ref 3.5–5)
ALP SERPL-CCNC: 92 U/L (ref 34–104)
ALT SERPL W P-5'-P-CCNC: 24 U/L (ref 7–52)
ANION GAP SERPL CALCULATED.3IONS-SCNC: 7 MMOL/L
AST SERPL W P-5'-P-CCNC: 20 U/L (ref 13–39)
BACTERIA UR QL AUTO: ABNORMAL /HPF
BASOPHILS # BLD AUTO: 0.05 THOUSANDS/ÂΜL (ref 0–0.1)
BASOPHILS NFR BLD AUTO: 1 % (ref 0–1)
BILIRUB SERPL-MCNC: 0.63 MG/DL (ref 0.2–1)
BILIRUB UR QL STRIP: NEGATIVE
BUN SERPL-MCNC: 18 MG/DL (ref 5–25)
CALCIUM SERPL-MCNC: 10 MG/DL (ref 8.4–10.2)
CHLORIDE SERPL-SCNC: 99 MMOL/L (ref 96–108)
CLARITY UR: CLEAR
CO2 SERPL-SCNC: 30 MMOL/L (ref 21–32)
COLOR UR: ABNORMAL
CREAT SERPL-MCNC: 1.56 MG/DL (ref 0.6–1.3)
CREAT UR-MCNC: 73.5 MG/DL
EOSINOPHIL # BLD AUTO: 0.13 THOUSAND/ÂΜL (ref 0–0.61)
EOSINOPHIL NFR BLD AUTO: 2 % (ref 0–6)
ERYTHROCYTE [DISTWIDTH] IN BLOOD BY AUTOMATED COUNT: 12.3 % (ref 11.6–15.1)
GFR SERPL CREATININE-BSD FRML MDRD: 48 ML/MIN/1.73SQ M
GLUCOSE P FAST SERPL-MCNC: 79 MG/DL (ref 65–99)
GLUCOSE UR STRIP-MCNC: ABNORMAL MG/DL
HCT VFR BLD AUTO: 46.1 % (ref 36.5–49.3)
HGB BLD-MCNC: 16.2 G/DL (ref 12–17)
HGB UR QL STRIP.AUTO: NEGATIVE
IMM GRANULOCYTES # BLD AUTO: 0.03 THOUSAND/UL (ref 0–0.2)
IMM GRANULOCYTES NFR BLD AUTO: 0 % (ref 0–2)
KETONES UR STRIP-MCNC: NEGATIVE MG/DL
LEUKOCYTE ESTERASE UR QL STRIP: NEGATIVE
LYMPHOCYTES # BLD AUTO: 2.74 THOUSANDS/ÂΜL (ref 0.6–4.47)
LYMPHOCYTES NFR BLD AUTO: 37 % (ref 14–44)
MCH RBC QN AUTO: 31.6 PG (ref 26.8–34.3)
MCHC RBC AUTO-ENTMCNC: 35.1 G/DL (ref 31.4–37.4)
MCV RBC AUTO: 90 FL (ref 82–98)
MONOCYTES # BLD AUTO: 0.89 THOUSAND/ÂΜL (ref 0.17–1.22)
MONOCYTES NFR BLD AUTO: 12 % (ref 4–12)
NEUTROPHILS # BLD AUTO: 3.51 THOUSANDS/ÂΜL (ref 1.85–7.62)
NEUTS SEG NFR BLD AUTO: 48 % (ref 43–75)
NITRITE UR QL STRIP: NEGATIVE
NON-SQ EPI CELLS URNS QL MICRO: ABNORMAL /HPF
NRBC BLD AUTO-RTO: 0 /100 WBCS
PH UR STRIP.AUTO: 6.5 [PH]
PHOSPHATE SERPL-MCNC: 3 MG/DL (ref 2.7–4.5)
PLATELET # BLD AUTO: 253 THOUSANDS/UL (ref 149–390)
PMV BLD AUTO: 10.2 FL (ref 8.9–12.7)
POTASSIUM SERPL-SCNC: 4.3 MMOL/L (ref 3.5–5.3)
PROT SERPL-MCNC: 8.2 G/DL (ref 6.4–8.4)
PROT UR STRIP-MCNC: ABNORMAL MG/DL
PROT UR-MCNC: 28 MG/DL
PROT/CREAT UR: 0.38 MG/G{CREAT} (ref 0–0.1)
PTH-INTACT SERPL-MCNC: 31 PG/ML (ref 12–88)
RBC # BLD AUTO: 5.13 MILLION/UL (ref 3.88–5.62)
RBC #/AREA URNS AUTO: ABNORMAL /HPF
SODIUM SERPL-SCNC: 136 MMOL/L (ref 135–147)
SP GR UR STRIP.AUTO: 1.01 (ref 1–1.03)
UROBILINOGEN UR STRIP-ACNC: <2 MG/DL
WBC # BLD AUTO: 7.35 THOUSAND/UL (ref 4.31–10.16)
WBC #/AREA URNS AUTO: ABNORMAL /HPF

## 2023-07-18 ENCOUNTER — OFFICE VISIT (OUTPATIENT)
Dept: NEPHROLOGY | Facility: CLINIC | Age: 58
End: 2023-07-18
Payer: COMMERCIAL

## 2023-07-18 VITALS
HEART RATE: 64 BPM | RESPIRATION RATE: 16 BRPM | BODY MASS INDEX: 22.13 KG/M2 | SYSTOLIC BLOOD PRESSURE: 120 MMHG | OXYGEN SATURATION: 97 % | HEIGHT: 68 IN | DIASTOLIC BLOOD PRESSURE: 80 MMHG | WEIGHT: 146 LBS | TEMPERATURE: 97.7 F

## 2023-07-18 DIAGNOSIS — N18.31 STAGE 3A CHRONIC KIDNEY DISEASE (HCC): Primary | ICD-10-CM

## 2023-07-18 PROCEDURE — 99215 OFFICE O/P EST HI 40 MIN: CPT | Performed by: INTERNAL MEDICINE

## 2023-07-18 NOTE — LETTER
July 18, 2023     Moe Bhardwaj    Patient: Julio Cesar Orozco   YOB: 1965   Date of Visit: 7/18/2023       Dear Dr. Octaviano Schlatter: Thank you for referring Julio Cesar Orozco to me for evaluation. Below are my notes for this consultation. If you have questions, please do not hesitate to call me. I look forward to following your patient along with you. Sincerely,        Rolando Fraser MD        CC: No Recipients    Rolando Fraser MD  7/18/2023 12:13 PM  Incomplete  NEPHROLOGY PROGRESS NOTE    Patient: Julio Cesar Orozco               Sex: male          DOA: No admission date for patient encounter. YOB: 1965        Age:  62 y.o.          7/18/2023        BACKGROUND     48 y.o. M with PMH of HIV disease, cryptococcal meningitis, CKD III who has been following up in Renal clinic since at least 2017. SUBJECTIVE      Patient following up after 6 months. Taking Lisinopril 2.5 mg PO 3 tablets daily    REVIEW OF SYSTEMS     Review of Systems   Constitutional: Negative. HENT: Negative. Eyes: Negative. Respiratory: Negative. Cardiovascular: Negative. Gastrointestinal: Negative. Endocrine: Negative. Genitourinary: Negative. Musculoskeletal: Negative. Skin: Negative. Allergic/Immunologic: Negative. Neurological: Negative. Hematological: Negative. All other systems reviewed and are negative. OBJECTIVE     Current Weight: Weight - Scale: 66.2 kg (146 lb)  Vitals:    07/18/23 1155   BP: 120/80   Pulse: 64   Resp: 16   Temp: 97.7 °F (36.5 °C)   SpO2: 97%     Body mass index is 22.2 kg/m².       CURRENT MEDICATIONS       Current Outpatient Medications:   •  bictegravir-emtricitab-tenofovir alafenamide (BIKTARVY) -25 MG tablet, Take by mouth, Disp: , Rfl:   •  hydrocortisone (ANUSOL-HC) 2.5 % rectal cream, , Disp: , Rfl:   •  lisinopril (ZESTRIL) 2.5 mg tablet, TAKE 1 TABLET TWICE A DAY (Patient taking differently: 2.5 mg 2.5 mg in AM and 5 mg in PM.), Disp: 180 tablet, Rfl: 0  •  lisinopril (ZESTRIL) 2.5 mg tablet, Take 1 tablet (2.5 mg total) by mouth 2 (two) times a day, Disp: 180 tablet, Rfl: 0  •  pravastatin (PRAVACHOL) 20 mg tablet, , Disp: , Rfl:       PHYSICAL EXAMINATION     Physical Exam  HENT:      Head: Normocephalic and atraumatic. Eyes:      Pupils: Pupils are equal, round, and reactive to light. Neck:      Vascular: No JVD. Cardiovascular:      Rate and Rhythm: Normal rate and regular rhythm. Heart sounds: Normal heart sounds. No murmur heard. No friction rub. Pulmonary:      Effort: Pulmonary effort is normal.      Breath sounds: Normal breath sounds. Abdominal:      General: Bowel sounds are normal. There is no distension. Palpations: Abdomen is soft. Tenderness: There is no abdominal tenderness. There is no rebound. Musculoskeletal:         General: No tenderness. Cervical back: Neck supple. Skin:     General: Skin is dry. Findings: No rash. Neurological:      Mental Status: He is alert and oriented to person, place, and time. LAB RESULTS     Results from last 6 Months   Lab Units 07/11/23  1139   WBC Thousand/uL 7.35   HEMOGLOBIN g/dL 16.2   HEMATOCRIT % 46.1   PLATELETS Thousands/uL 253   POTASSIUM mmol/L 4.3   CHLORIDE mmol/L 99   CO2 mmol/L 30   BUN mg/dL 18   CREATININE mg/dL 1.56*   CALCIUM mg/dL 10.0   PHOSPHORUS mg/dL 3.0             RADIOLOGY RESULTS      Reviewed. ASSESSMENT/PLAN     48 M with PMH of CKD III, cryptococcal meningitis, HIV disease who presents to Renal clinic for f/u.    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 Baseline S Cr has been 1.5-1.6 since at least 2017. Repeat labs last week revealing Cr of 1.5 mg/dl and stable. eGFR stands at 48 as well.  -avoidance of NSAIDs, contrast agents reiterated.     2) Proteinuria: Reduction in proteinuria noted down to 380 mg while on Lisinopril 2.5 mg three tablets daily. 3) secondary hyperparathyroidism of renal origin: Vitamin D is within acceptable range at 38. Asked patient to restart taking daily. 4) Anemia due to CKD stage 3: Hb is 16.2 g/dl and above target. 5) Nutrition: Moderate potassium, low to moderate protein intake up to 40 g recommended. 6. HIV:  On HAART therapy     Follow-up in 12 months.             Sherman Began  Nephrology  7/18/2023

## 2023-07-18 NOTE — PROGRESS NOTES
NEPHROLOGY PROGRESS NOTE    Patient: Aldair Rocha               Sex: male          DOA: No admission date for patient encounter. YOB: 1965        Age:  62 y.o.          7/18/2023        BACKGROUND     48 y.o. M with PMH of HIV disease, cryptococcal meningitis, CKD III who has been following up in Renal clinic since at least 2017. SUBJECTIVE      Patient following up after 6 months. Taking Lisinopril 2.5 mg PO 3 tablets daily    REVIEW OF SYSTEMS     Review of Systems   Constitutional: Negative. HENT: Negative. Eyes: Negative. Respiratory: Negative. Cardiovascular: Negative. Gastrointestinal: Negative. Endocrine: Negative. Genitourinary: Negative. Musculoskeletal: Negative. Skin: Negative. Allergic/Immunologic: Negative. Neurological: Negative. Hematological: Negative. All other systems reviewed and are negative. OBJECTIVE     Current Weight: Weight - Scale: 66.2 kg (146 lb)  Vitals:    07/18/23 1155   BP: 120/80   Pulse: 64   Resp: 16   Temp: 97.7 °F (36.5 °C)   SpO2: 97%     Body mass index is 22.2 kg/m². CURRENT MEDICATIONS       Current Outpatient Medications:   •  bictegravir-emtricitab-tenofovir alafenamide (BIKTARVY) -25 MG tablet, Take by mouth, Disp: , Rfl:   •  hydrocortisone (ANUSOL-HC) 2.5 % rectal cream, , Disp: , Rfl:   •  lisinopril (ZESTRIL) 2.5 mg tablet, TAKE 1 TABLET TWICE A DAY (Patient taking differently: 2.5 mg 2.5 mg in AM and 5 mg in PM.), Disp: 180 tablet, Rfl: 0  •  lisinopril (ZESTRIL) 2.5 mg tablet, Take 1 tablet (2.5 mg total) by mouth 2 (two) times a day, Disp: 180 tablet, Rfl: 0  •  pravastatin (PRAVACHOL) 20 mg tablet, , Disp: , Rfl:       PHYSICAL EXAMINATION     Physical Exam  HENT:      Head: Normocephalic and atraumatic. Eyes:      Pupils: Pupils are equal, round, and reactive to light. Neck:      Vascular: No JVD. Cardiovascular:      Rate and Rhythm: Normal rate and regular rhythm.       Heart sounds: Normal heart sounds. No murmur heard. No friction rub. Pulmonary:      Effort: Pulmonary effort is normal.      Breath sounds: Normal breath sounds. Abdominal:      General: Bowel sounds are normal. There is no distension. Palpations: Abdomen is soft. Tenderness: There is no abdominal tenderness. There is no rebound. Musculoskeletal:         General: No tenderness. Cervical back: Neck supple. Skin:     General: Skin is dry. Findings: No rash. Neurological:      Mental Status: He is alert and oriented to person, place, and time. LAB RESULTS     Results from last 6 Months   Lab Units 07/11/23  1139   WBC Thousand/uL 7.35   HEMOGLOBIN g/dL 16.2   HEMATOCRIT % 46.1   PLATELETS Thousands/uL 253   POTASSIUM mmol/L 4.3   CHLORIDE mmol/L 99   CO2 mmol/L 30   BUN mg/dL 18   CREATININE mg/dL 1.56*   CALCIUM mg/dL 10.0   PHOSPHORUS mg/dL 3.0             RADIOLOGY RESULTS      Reviewed. ASSESSMENT/PLAN     48 M with PMH of CKD III, cryptococcal meningitis, HIV disease who presents to Renal clinic for f/u.    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 Baseline S Cr has been 1.5-1.6 since at least 2017. Repeat labs last week revealing Cr of 1.5 mg/dl and stable. eGFR stands at 48 as well.  -avoidance of NSAIDs, contrast agents reiterated. 2) Proteinuria: Reduction in proteinuria noted down to 380 mg while on Lisinopril 2.5 mg three tablets daily. 3) secondary hyperparathyroidism of renal origin: Vitamin D is within acceptable range at 38. Asked patient to restart taking daily. 4) Anemia due to CKD stage 3: Hb is 16.2 g/dl and above target. 5) Nutrition: Moderate potassium, low to moderate protein intake up to 40 g recommended. 6. HIV:  On HAART therapy     Follow-up in 12 months.             Gloria Dupont  Nephrology  7/18/2023

## 2023-08-15 DIAGNOSIS — R80.1 PERSISTENT PROTEINURIA: ICD-10-CM

## 2023-08-15 RX ORDER — LISINOPRIL 2.5 MG/1
2.5 TABLET ORAL 2 TIMES DAILY
Qty: 180 TABLET | Refills: 0 | Status: SHIPPED | OUTPATIENT
Start: 2023-08-15

## 2023-10-10 ENCOUNTER — OFFICE VISIT (OUTPATIENT)
Dept: UROLOGY | Facility: CLINIC | Age: 58
End: 2023-10-10
Payer: COMMERCIAL

## 2023-10-10 ENCOUNTER — TELEPHONE (OUTPATIENT)
Dept: UROLOGY | Facility: CLINIC | Age: 58
End: 2023-10-10

## 2023-10-10 ENCOUNTER — APPOINTMENT (OUTPATIENT)
Dept: LAB | Facility: HOSPITAL | Age: 58
End: 2023-10-10
Payer: COMMERCIAL

## 2023-10-10 VITALS
HEART RATE: 59 BPM | HEIGHT: 68 IN | DIASTOLIC BLOOD PRESSURE: 86 MMHG | WEIGHT: 152.2 LBS | BODY MASS INDEX: 23.07 KG/M2 | SYSTOLIC BLOOD PRESSURE: 120 MMHG | OXYGEN SATURATION: 99 %

## 2023-10-10 DIAGNOSIS — R35.1 BENIGN PROSTATIC HYPERPLASIA WITH NOCTURIA: ICD-10-CM

## 2023-10-10 DIAGNOSIS — N40.1 BENIGN PROSTATIC HYPERPLASIA WITH NOCTURIA: ICD-10-CM

## 2023-10-10 DIAGNOSIS — R97.20 ELEVATED PSA: ICD-10-CM

## 2023-10-10 DIAGNOSIS — R97.20 ELEVATED PSA: Primary | ICD-10-CM

## 2023-10-10 LAB
POST-VOID RESIDUAL VOLUME, ML POC: 8 ML
PSA SERPL-MCNC: 0.83 NG/ML (ref 0–4)
SL AMB  POCT GLUCOSE, UA: NORMAL
SL AMB LEUKOCYTE ESTERASE,UA: NORMAL
SL AMB POCT BILIRUBIN,UA: NORMAL
SL AMB POCT BLOOD,UA: NORMAL
SL AMB POCT CLARITY,UA: CLEAR
SL AMB POCT COLOR,UA: YELLOW
SL AMB POCT KETONES,UA: NORMAL
SL AMB POCT NITRITE,UA: NORMAL
SL AMB POCT PH,UA: 5
SL AMB POCT SPECIFIC GRAVITY,UA: 1
SL AMB POCT URINE PROTEIN: NORMAL
SL AMB POCT UROBILINOGEN: 0.2

## 2023-10-10 PROCEDURE — 84153 ASSAY OF PSA TOTAL: CPT

## 2023-10-10 PROCEDURE — 51798 US URINE CAPACITY MEASURE: CPT | Performed by: PHYSICIAN ASSISTANT

## 2023-10-10 PROCEDURE — 99204 OFFICE O/P NEW MOD 45 MIN: CPT | Performed by: PHYSICIAN ASSISTANT

## 2023-10-10 PROCEDURE — 36415 COLL VENOUS BLD VENIPUNCTURE: CPT

## 2023-10-10 PROCEDURE — 81002 URINALYSIS NONAUTO W/O SCOPE: CPT | Performed by: PHYSICIAN ASSISTANT

## 2023-10-10 RX ORDER — TAMSULOSIN HYDROCHLORIDE 0.4 MG/1
0.4 CAPSULE ORAL
Qty: 30 CAPSULE | Refills: 2 | Status: SHIPPED | OUTPATIENT
Start: 2023-10-10

## 2023-10-10 NOTE — PROGRESS NOTES
10/10/2023      Chief Complaint   Patient presents with   • New Patient Visit     Elevated PSA     Assessment and Plan    62 y.o. male new patient    1. Elevated/increasing PSA  - PSA from 9/14/23 was 3.6  - PSA 0.8 in 2020 and 0.71 in 2017  - MAN negative  - Obtain repeat PSA  - If PSA remains progressively increased, will obtain prostate MRI for further evaluation and utilize this for MRI fusion guided biopsy if any concerning lesions. 2. LUTS  - Will trial Flomax    - Will monitor and contact patient with PSA results and recommendations. History of Present Illness  Allyssa Ng is a 62 y.o. male here for new patient evaluation of elevated PSA. Recent PSA increased to 3.6. Previously PSAs were 0.7 and 0.8 several years ago. He reports his PSA last year was 0.6. He denies any family history  malignancy. Denies any prior  surgical manipulation. He does note worsening urinary frequency, nocturia, and weak urinary stream at times. Denies any prior treatment for this. Urine dip negative  PVR-8 mL     Review of Systems   Constitutional: Negative for chills and fever. Respiratory: Negative for shortness of breath. Cardiovascular: Negative for chest pain. Gastrointestinal: Negative for abdominal pain. Genitourinary: Positive for difficulty urinating and frequency. Negative for dysuria, flank pain, hematuria and urgency. Neurological: Negative for dizziness.            AUA SYMPTOM SCORE    Flowsheet Row Most Recent Value   AUA SYMPTOM SCORE    How often have you had a sensation of not emptying your bladder completely after you finished urinating? 4 (P)     How often have you had to urinate again less than two hours after you finished urinating? 2 (P)     How often have you found you stopped and started again several times when you urinate? 3 (P)     How often have you found it difficult to postpone urination? 1 (P)     How often have you had a weak urinary stream? 4 (P)     How often have you had to push or strain to begin urination? 0 (P)     How many times did you most typically get up to urinate from the time you went to bed at night until the time you got up in the morning? 5 (P)     Quality of Life: If you were to spend the rest of your life with your urinary condition just the way it is now, how would you feel about that? 4 (P)     AUA SYMPTOM SCORE 19 (P)              Past Medical History  Past Medical History:   Diagnosis Date   • Chronic kidney disease    • HIV (human immunodeficiency virus infection) ([facility])    • Hyperlipidemia    • Lyme disease        Past Social History  Past Surgical History:   Procedure Laterality Date   • CYST REMOVAL       Social History     Tobacco Use   Smoking Status Never   Smokeless Tobacco Never       Past Family History  Family History   Problem Relation Age of Onset   • Hypertension Mother    • No Known Problems Father        Past Social history  Social History     Socioeconomic History   • Marital status: Single     Spouse name: Not on file   • Number of children: Not on file   • Years of education: Not on file   • Highest education level: Not on file   Occupational History   • Not on file   Tobacco Use   • Smoking status: Never   • Smokeless tobacco: Never   Vaping Use   • Vaping Use: Never used   Substance and Sexual Activity   • Alcohol use: No   • Drug use: No   • Sexual activity: Yes     Partners: Female   Other Topics Concern   • Not on file   Social History Narrative   • Not on file     Social Determinants of Health     Financial Resource Strain: Not on file   Food Insecurity: Not on file   Transportation Needs: Not on file   Physical Activity: Not on file   Stress: Not on file   Social Connections: Not on file   Intimate Partner Violence: Not on file   Housing Stability: Not on file       Current Medications  Current Outpatient Medications   Medication Sig Dispense Refill   • bictegravir-emtricitab-tenofovir alafenamide (BIKTARVY) -25 MG tablet Take by mouth     • hydrocortisone (ANUSOL-HC) 2.5 % rectal cream      • lisinopril (ZESTRIL) 2.5 mg tablet TAKE 1 TABLET TWICE A DAY (Patient taking differently: 2.5 mg 2.5 mg in AM and 5 mg in PM.) 180 tablet 0   • lisinopril (ZESTRIL) 2.5 mg tablet TAKE 1 TABLET TWICE A  tablet 0   • pravastatin (PRAVACHOL) 20 mg tablet        No current facility-administered medications for this visit. Allergies  No Known Allergies      The following portions of the patient's history were reviewed and updated as appropriate: allergies, current medications, past medical history, past social history, past surgical history and problem list.      Vitals  Vitals:    10/10/23 1316   BP: 120/86   Pulse: 59   SpO2: 99%   Weight: 69 kg (152 lb 3.2 oz)   Height: 5' 8" (1.727 m)           Physical Exam  Physical Exam  Constitutional:       Appearance: Normal appearance. HENT:      Head: Normocephalic and atraumatic. Right Ear: External ear normal.      Left Ear: External ear normal.      Nose: Nose normal.   Eyes:      General: No scleral icterus. Conjunctiva/sclera: Conjunctivae normal.   Cardiovascular:      Pulses: Normal pulses. Pulmonary:      Effort: Pulmonary effort is normal.   Genitourinary:     Comments: Prostate approx 30 g without nodularity or tenderness  Musculoskeletal:         General: Normal range of motion. Cervical back: Normal range of motion. Skin:     General: Skin is warm and dry. Neurological:      General: No focal deficit present. Mental Status: He is alert and oriented to person, place, and time. Psychiatric:         Mood and Affect: Mood normal.         Behavior: Behavior normal.         Thought Content:  Thought content normal.         Judgment: Judgment normal.           Results  Recent Results (from the past 1 hour(s))   POCT urine dip    Collection Time: 10/10/23  1:18 PM   Result Value Ref Range    LEUKOCYTE ESTERASE,UA -     NITRITE,UA -     SL AMB POCT UROBILINOGEN 0.2 POCT URINE PROTEIN trace      PH,UA 5.0     BLOOD,UA -     SPECIFIC GRAVITY,UA 1.005     [address] -     GLUCOSE, UA -      COLOR,UA yellow     CLARITY,UA clear    POCT Measure PVR    Collection Time: 10/10/23  1:20 PM   Result Value Ref Range    POST-VOID RESIDUAL VOLUME, ML POC 8 mL   ]  No results found for: "PSA"  Lab Results   Component Value Date    CALCIUM 10.0 07/11/2023    K 4.3 07/11/2023    CO2 30 07/11/2023    CL 99 07/11/2023    BUN 18 07/11/2023    CREATININE 1.56 (H) 07/11/2023     Lab Results   Component Value Date    WBC 7.35 07/11/2023    HGB 16.2 07/11/2023    HCT 46.1 07/11/2023    MCV 90 07/11/2023     07/11/2023           Orders  Orders Placed This Encounter   Procedures   • POCT urine dip   • POCT Measure PVR       Kate Duenas

## 2023-10-11 NOTE — TELEPHONE ENCOUNTER
seen today by Alise Goodwin for elevated PSA likely fluke fluctuation as it resolved    please let him know good news the repeat PSA is back to his normal 0.8 today    no concern  no mri  no biopsy    suggest next psa in 1 year  f/u prn

## 2023-10-14 DIAGNOSIS — R80.1 PERSISTENT PROTEINURIA: ICD-10-CM

## 2023-10-14 RX ORDER — LISINOPRIL 2.5 MG/1
2.5 TABLET ORAL 2 TIMES DAILY
Qty: 180 TABLET | Refills: 0 | Status: SHIPPED | OUTPATIENT
Start: 2023-10-14

## 2023-10-18 ENCOUNTER — TELEPHONE (OUTPATIENT)
Dept: NEPHROLOGY | Facility: CLINIC | Age: 58
End: 2023-10-18

## 2023-10-18 NOTE — TELEPHONE ENCOUNTER
Patient called and schedule his 15 month nephrology follow up appointment with Dr. Rod Whitney from our recall list. The patient understood and was okay with the day and time of his next appointment.

## 2023-11-01 DIAGNOSIS — R35.1 BENIGN PROSTATIC HYPERPLASIA WITH NOCTURIA: ICD-10-CM

## 2023-11-01 DIAGNOSIS — N40.1 BENIGN PROSTATIC HYPERPLASIA WITH NOCTURIA: ICD-10-CM

## 2023-11-01 DIAGNOSIS — R80.1 PERSISTENT PROTEINURIA: ICD-10-CM

## 2023-11-01 RX ORDER — TAMSULOSIN HYDROCHLORIDE 0.4 MG/1
0.4 CAPSULE ORAL
Qty: 90 CAPSULE | Refills: 1 | Status: SHIPPED | OUTPATIENT
Start: 2023-11-01

## 2023-11-02 RX ORDER — LISINOPRIL 2.5 MG/1
2.5 TABLET ORAL 2 TIMES DAILY
Qty: 180 TABLET | Refills: 0 | Status: SHIPPED | OUTPATIENT
Start: 2023-11-02

## 2023-12-19 DIAGNOSIS — R80.1 PERSISTENT PROTEINURIA: ICD-10-CM

## 2023-12-19 NOTE — TELEPHONE ENCOUNTER
Patient called office requesting refill for medication. per patient he takes lisinopril (ZESTRIL) 2.5 mg TID patient states  sent in for patient to take 2.5 mg BID and that is incorrect. he will like a new Rx sent to [facility] mail order. Per patient he is running out of medication.

## 2023-12-21 RX ORDER — LISINOPRIL 2.5 MG/1
2.5 TABLET ORAL 3 TIMES DAILY
Qty: 270 TABLET | Refills: 0 | Status: SHIPPED | OUTPATIENT
Start: 2023-12-21

## 2024-02-16 ENCOUNTER — TELEPHONE (OUTPATIENT)
Dept: NEPHROLOGY | Facility: CLINIC | Age: 59
End: 2024-02-16

## 2024-02-16 NOTE — TELEPHONE ENCOUNTER
I called and spoke to the patient and reschedule his 7/18/2024 nephrology follow up appointment with Dr. BOB Michaels because of Dr. BOB Michaels on hospital call. The patient appointment was reschedule to 9/18/2024 and the patient understood and was okay with the day and time of his next appointment.

## 2024-03-17 DIAGNOSIS — R80.1 PERSISTENT PROTEINURIA: ICD-10-CM

## 2024-03-18 RX ORDER — LISINOPRIL 2.5 MG/1
2.5 TABLET ORAL 3 TIMES DAILY
Qty: 270 TABLET | Refills: 0 | Status: SHIPPED | OUTPATIENT
Start: 2024-03-18

## 2024-06-12 DIAGNOSIS — R80.1 PERSISTENT PROTEINURIA: ICD-10-CM

## 2024-06-13 RX ORDER — LISINOPRIL 2.5 MG/1
2.5 TABLET ORAL 3 TIMES DAILY
Qty: 270 TABLET | Refills: 0 | Status: SHIPPED | OUTPATIENT
Start: 2024-06-13

## 2024-09-04 ENCOUNTER — TELEPHONE (OUTPATIENT)
Dept: NEPHROLOGY | Facility: CLINIC | Age: 59
End: 2024-09-04

## 2024-09-04 NOTE — TELEPHONE ENCOUNTER
Called spoke with patient advised patient to get  non-fasting labs done 1 week prior to 09/18/24 scheduled follow-up appointment with Dr.Adeem Michaels. also advised as a reminder If labs / testing are not done in the appropriate time for scheduled appointment, appointment may need to be rescheduled. patient verbalized understanding and is okay with it.

## 2024-09-07 DIAGNOSIS — R80.1 PERSISTENT PROTEINURIA: ICD-10-CM

## 2024-09-07 RX ORDER — LISINOPRIL 2.5 MG/1
2.5 TABLET ORAL 3 TIMES DAILY
Qty: 270 TABLET | Refills: 1 | Status: SHIPPED | OUTPATIENT
Start: 2024-09-07

## 2024-09-11 ENCOUNTER — APPOINTMENT (OUTPATIENT)
Dept: LAB | Facility: HOSPITAL | Age: 59
End: 2024-09-11
Payer: COMMERCIAL

## 2024-09-11 DIAGNOSIS — N18.30 STAGE 3 CHRONIC KIDNEY DISEASE, UNSPECIFIED WHETHER STAGE 3A OR 3B CKD (HCC): Primary | ICD-10-CM

## 2024-09-11 DIAGNOSIS — N18.30 STAGE 3 CHRONIC KIDNEY DISEASE, UNSPECIFIED WHETHER STAGE 3A OR 3B CKD (HCC): ICD-10-CM

## 2024-09-11 LAB
25(OH)D3 SERPL-MCNC: 45.9 NG/ML (ref 30–100)
ALBUMIN SERPL BCG-MCNC: 4.6 G/DL (ref 3.5–5)
ALP SERPL-CCNC: 92 U/L (ref 34–104)
ALT SERPL W P-5'-P-CCNC: 21 U/L (ref 7–52)
ANION GAP SERPL CALCULATED.3IONS-SCNC: 7 MMOL/L (ref 4–13)
AST SERPL W P-5'-P-CCNC: 22 U/L (ref 13–39)
BACTERIA UR QL AUTO: ABNORMAL /HPF
BASOPHILS # BLD AUTO: 0.04 THOUSANDS/ΜL (ref 0–0.1)
BASOPHILS NFR BLD AUTO: 1 % (ref 0–1)
BILIRUB SERPL-MCNC: 0.84 MG/DL (ref 0.2–1)
BILIRUB UR QL STRIP: NEGATIVE
BUN SERPL-MCNC: 19 MG/DL (ref 5–25)
CALCIUM SERPL-MCNC: 9.6 MG/DL (ref 8.4–10.2)
CHLORIDE SERPL-SCNC: 100 MMOL/L (ref 96–108)
CLARITY UR: CLEAR
CO2 SERPL-SCNC: 28 MMOL/L (ref 21–32)
COLOR UR: ABNORMAL
CREAT SERPL-MCNC: 1.45 MG/DL (ref 0.6–1.3)
CREAT UR-MCNC: 59.8 MG/DL
EOSINOPHIL # BLD AUTO: 0.12 THOUSAND/ΜL (ref 0–0.61)
EOSINOPHIL NFR BLD AUTO: 2 % (ref 0–6)
ERYTHROCYTE [DISTWIDTH] IN BLOOD BY AUTOMATED COUNT: 12.4 % (ref 11.6–15.1)
GFR SERPL CREATININE-BSD FRML MDRD: 52 ML/MIN/1.73SQ M
GLUCOSE SERPL-MCNC: 85 MG/DL (ref 65–140)
GLUCOSE UR STRIP-MCNC: NEGATIVE MG/DL
HCT VFR BLD AUTO: 50.6 % (ref 36.5–49.3)
HGB BLD-MCNC: 17.3 G/DL (ref 12–17)
HGB UR QL STRIP.AUTO: NEGATIVE
IMM GRANULOCYTES # BLD AUTO: 0.03 THOUSAND/UL (ref 0–0.2)
IMM GRANULOCYTES NFR BLD AUTO: 0 % (ref 0–2)
KETONES UR STRIP-MCNC: NEGATIVE MG/DL
LEUKOCYTE ESTERASE UR QL STRIP: NEGATIVE
LYMPHOCYTES # BLD AUTO: 2.95 THOUSANDS/ΜL (ref 0.6–4.47)
LYMPHOCYTES NFR BLD AUTO: 41 % (ref 14–44)
MCH RBC QN AUTO: 31.3 PG (ref 26.8–34.3)
MCHC RBC AUTO-ENTMCNC: 34.2 G/DL (ref 31.4–37.4)
MCV RBC AUTO: 92 FL (ref 82–98)
MICROALBUMIN UR-MCNC: 49.2 MG/L
MICROALBUMIN/CREAT 24H UR: 82 MG/G CREATININE (ref 0–30)
MONOCYTES # BLD AUTO: 0.82 THOUSAND/ΜL (ref 0.17–1.22)
MONOCYTES NFR BLD AUTO: 12 % (ref 4–12)
NEUTROPHILS # BLD AUTO: 3.16 THOUSANDS/ΜL (ref 1.85–7.62)
NEUTS SEG NFR BLD AUTO: 44 % (ref 43–75)
NITRITE UR QL STRIP: NEGATIVE
NON-SQ EPI CELLS URNS QL MICRO: ABNORMAL /HPF
NRBC BLD AUTO-RTO: 0 /100 WBCS
PH UR STRIP.AUTO: 5.5 [PH]
PHOSPHATE SERPL-MCNC: 2.5 MG/DL (ref 2.7–4.5)
PLATELET # BLD AUTO: 251 THOUSANDS/UL (ref 149–390)
PMV BLD AUTO: 10.3 FL (ref 8.9–12.7)
POTASSIUM SERPL-SCNC: 4.5 MMOL/L (ref 3.5–5.3)
PROT SERPL-MCNC: 8.4 G/DL (ref 6.4–8.4)
PROT UR STRIP-MCNC: NEGATIVE MG/DL
PTH-INTACT SERPL-MCNC: 52.6 PG/ML (ref 12–88)
RBC # BLD AUTO: 5.53 MILLION/UL (ref 3.88–5.62)
RBC #/AREA URNS AUTO: ABNORMAL /HPF
SODIUM SERPL-SCNC: 135 MMOL/L (ref 135–147)
SP GR UR STRIP.AUTO: 1.01 (ref 1–1.03)
UROBILINOGEN UR STRIP-ACNC: <2 MG/DL
WBC # BLD AUTO: 7.12 THOUSAND/UL (ref 4.31–10.16)
WBC #/AREA URNS AUTO: ABNORMAL /HPF

## 2024-09-11 PROCEDURE — 82306 VITAMIN D 25 HYDROXY: CPT

## 2024-09-11 PROCEDURE — 84100 ASSAY OF PHOSPHORUS: CPT

## 2024-09-11 PROCEDURE — 85025 COMPLETE CBC W/AUTO DIFF WBC: CPT

## 2024-09-11 PROCEDURE — 82570 ASSAY OF URINE CREATININE: CPT

## 2024-09-11 PROCEDURE — 36415 COLL VENOUS BLD VENIPUNCTURE: CPT

## 2024-09-11 PROCEDURE — 80053 COMPREHEN METABOLIC PANEL: CPT

## 2024-09-11 PROCEDURE — 81001 URINALYSIS AUTO W/SCOPE: CPT

## 2024-09-11 PROCEDURE — 82043 UR ALBUMIN QUANTITATIVE: CPT

## 2024-09-11 PROCEDURE — 83970 ASSAY OF PARATHORMONE: CPT

## 2024-09-18 ENCOUNTER — OFFICE VISIT (OUTPATIENT)
Dept: NEPHROLOGY | Facility: CLINIC | Age: 59
End: 2024-09-18
Payer: COMMERCIAL

## 2024-09-18 VITALS
BODY MASS INDEX: 23.23 KG/M2 | WEIGHT: 148 LBS | HEIGHT: 67 IN | SYSTOLIC BLOOD PRESSURE: 106 MMHG | HEART RATE: 67 BPM | RESPIRATION RATE: 16 BRPM | TEMPERATURE: 97 F | DIASTOLIC BLOOD PRESSURE: 70 MMHG | OXYGEN SATURATION: 99 %

## 2024-09-18 DIAGNOSIS — N18.31 STAGE 3A CHRONIC KIDNEY DISEASE (HCC): Primary | ICD-10-CM

## 2024-09-18 DIAGNOSIS — N25.81 SECONDARY HYPERPARATHYROIDISM OF RENAL ORIGIN (HCC): ICD-10-CM

## 2024-09-18 DIAGNOSIS — R80.1 PERSISTENT PROTEINURIA: ICD-10-CM

## 2024-09-18 PROBLEM — N18.32 STAGE 3B CHRONIC KIDNEY DISEASE (HCC): Status: ACTIVE | Noted: 2024-09-18

## 2024-09-18 PROCEDURE — 99214 OFFICE O/P EST MOD 30 MIN: CPT | Performed by: INTERNAL MEDICINE

## 2024-09-18 PROCEDURE — G2211 COMPLEX E/M VISIT ADD ON: HCPCS | Performed by: INTERNAL MEDICINE

## 2024-09-18 NOTE — LETTER
2024     Dillon Boyd MD  [address]  [facility] [address]    Patient: Ashwin Sarkar   YOB: 1965   Date of Visit: 2024       Dear Dr. Boyd:    Thank you for referring Ashwin Sarkar to me for evaluation. Below are my notes for this consultation.    If you have questions, please do not hesitate to call me. I look forward to following your patient along with you.         Sincerely,        Arvin Michaels MD        CC: No Recipients    Arvin Michaels MD  2024  2:11 PM  Incomplete  Ambulatory Visit  Name: Ashwin Sarkar      : 1965      MRN: [id number]  Encounter Provider: Arvin Michaels MD  Encounter Date: 2024   Encounter department: [facility]    Assessment & Plan  Stage 3a chronic kidney disease (HCC)  Lab Results   Component Value Date    EGFR 52 2024    EGFR 48 2023    EGFR 48 01/10/2023    CREATININE 1.45 (H) 2024    CREATININE 1.56 (H) 2023    CREATININE 1.57 (H) 01/10/2023   Labs obtained revealed serum creatinine 1.4 mg/dL and within baseline.  Etiology of CKD likely hypertension, probable HAART induced renal dysfunction    Orders:  •  Albumin; Standing  •  Albumin / creatinine urine ratio; Standing  •  Calcium; Standing  •  CBC and differential; Standing  •  Comprehensive metabolic panel; Standing  •  Phosphorus; Standing  •  PTH, intact; Standing  •  Urinalysis with microscopic; Future  •  Vitamin D 25 hydroxy; Standing    Secondary hyperparathyroidism of renal origin (HCC)  Low phosphorus level down to 2.5 likely due to phosphorus wasting in the urine from Biktarvy.  Recommended either he takes vitamin D3 tablet every other day or consume foods rich in phosphorus such as cheese.  Normal calcium and 25-hydroxy vitamin D levels noted.       Persistent proteinuria         Urine albumin creatinine ratio revealing 81 mg per UACR and slightly above target.  This however is an improvement from urine  "protein creatinine ratio obtained last year.  He remains on lisinopril 7.5 mg daily.  History of Present Illness    Ashwin Sarkar is a 58 y.o. male who presents renal office for management of CKD.  Presents to me after greater than a year after his appointment was rescheduled.  Denies any events.  Saw urology and was placed on Flomax however did not like the pill and stopped it on his own.  Denies any dysuria, polyuria, nocturia.    History obtained from : patient  Review of Systems   Constitutional: Negative.    HENT: Negative.     Eyes: Negative.    Respiratory: Negative.     Cardiovascular: Negative.    Gastrointestinal: Negative.    Endocrine: Negative.    Genitourinary: Negative.    Musculoskeletal: Negative.    Skin: Negative.    Allergic/Immunologic: Negative.    Neurological: Negative.    Hematological: Negative.    All other systems reviewed and are negative.          Objective    /70   Pulse 67   Temp (!) 97 °F (36.1 °C) (Temporal)   Resp 16   Ht 5' 7\" (1.702 m)   Wt 67.1 kg (148 lb)   SpO2 99%   BMI 23.18 kg/m²     Physical Exam  HENT:      Head: Normocephalic and atraumatic.   Eyes:      Pupils: Pupils are equal, round, and reactive to light.   Neck:      Vascular: No JVD.   Cardiovascular:      Rate and Rhythm: Normal rate and regular rhythm.      Heart sounds: Normal heart sounds. No murmur heard.     No friction rub.   Pulmonary:      Effort: Pulmonary effort is normal.      Breath sounds: Normal breath sounds.   Abdominal:      General: Bowel sounds are normal. There is no distension.      Palpations: Abdomen is soft.      Tenderness: There is no abdominal tenderness. There is no rebound.   Musculoskeletal:         General: No tenderness.      Cervical back: Neck supple.   Skin:     General: Skin is dry.      Findings: No rash.   Neurological:      Mental Status: He is alert and oriented to person, place, and time.          "

## 2024-09-18 NOTE — ASSESSMENT & PLAN NOTE
Low phosphorus level down to 2.5 likely due to phosphorus wasting in the urine from Biktarvy.  Recommended either he takes vitamin D3 tablet every other day or consume foods rich in phosphorus such as cheese.  Normal calcium and 25-hydroxy vitamin D levels noted.

## 2024-09-18 NOTE — PROGRESS NOTES
Ambulatory Visit  Name: Ashwin Sarkar      : 1965      MRN: [id number]  Encounter Provider: Arvin Michaels MD  Encounter Date: 2024   Encounter department: [facility]    Assessment & Plan  Stage 3a chronic kidney disease (HCC)  Lab Results   Component Value Date    EGFR 52 2024    EGFR 48 2023    EGFR 48 01/10/2023    CREATININE 1.45 (H) 2024    CREATININE 1.56 (H) 2023    CREATININE 1.57 (H) 01/10/2023   Labs obtained revealed serum creatinine 1.4 mg/dL and within baseline.  Etiology of CKD likely hypertension, probable HAART induced renal dysfunction    Orders:    Albumin; Standing    Albumin / creatinine urine ratio; Standing    Calcium; Standing    CBC and differential; Standing    Comprehensive metabolic panel; Standing    Phosphorus; Standing    PTH, intact; Standing    Urinalysis with microscopic; Future    Vitamin D 25 hydroxy; Standing    Secondary hyperparathyroidism of renal origin (HCC)  Low phosphorus level down to 2.5 likely due to phosphorus wasting in the urine from Biktarvy.  Recommended either he takes vitamin D3 tablet every other day or consume foods rich in phosphorus such as cheese.  Normal calcium and 25-hydroxy vitamin D levels noted.       Persistent proteinuria         Urine albumin creatinine ratio revealing 81 mg per UACR and slightly above target.  This however is an improvement from urine protein creatinine ratio obtained last year.  He remains on lisinopril 7.5 mg daily.  History of Present Illness     Ashwin Sarkar is a 58 y.o. male who presents renal office for management of CKD.  Presents to me after greater than a year after his appointment was rescheduled.  Denies any events.  Saw urology and was placed on Flomax however did not like the pill and stopped it on his own.  Denies any dysuria, polyuria, nocturia.    History obtained from : patient  Review of Systems   Constitutional: Negative.    HENT:  "Negative.     Eyes: Negative.    Respiratory: Negative.     Cardiovascular: Negative.    Gastrointestinal: Negative.    Endocrine: Negative.    Genitourinary: Negative.    Musculoskeletal: Negative.    Skin: Negative.    Allergic/Immunologic: Negative.    Neurological: Negative.    Hematological: Negative.    All other systems reviewed and are negative.          Objective     /70   Pulse 67   Temp (!) 97 °F (36.1 °C) (Temporal)   Resp 16   Ht 5' 7\" (1.702 m)   Wt 67.1 kg (148 lb)   SpO2 99%   BMI 23.18 kg/m²     Physical Exam  HENT:      Head: Normocephalic and atraumatic.   Eyes:      Pupils: Pupils are equal, round, and reactive to light.   Neck:      Vascular: No JVD.   Cardiovascular:      Rate and Rhythm: Normal rate and regular rhythm.      Heart sounds: Normal heart sounds. No murmur heard.     No friction rub.   Pulmonary:      Effort: Pulmonary effort is normal.      Breath sounds: Normal breath sounds.   Abdominal:      General: Bowel sounds are normal. There is no distension.      Palpations: Abdomen is soft.      Tenderness: There is no abdominal tenderness. There is no rebound.   Musculoskeletal:         General: No tenderness.      Cervical back: Neck supple.   Skin:     General: Skin is dry.      Findings: No rash.   Neurological:      Mental Status: He is alert and oriented to person, place, and time.         "

## 2024-12-04 DIAGNOSIS — R80.1 PERSISTENT PROTEINURIA: ICD-10-CM

## 2024-12-04 RX ORDER — LISINOPRIL 2.5 MG/1
2.5 TABLET ORAL 3 TIMES DAILY
Qty: 270 TABLET | Refills: 1 | Status: SHIPPED | OUTPATIENT
Start: 2024-12-04

## 2025-07-24 ENCOUNTER — TELEPHONE (OUTPATIENT)
Age: 60
End: 2025-07-24

## 2025-07-24 NOTE — TELEPHONE ENCOUNTER
Patient calling asking if he needs labs before his septebmer appt. Confirmed with him that he did and he can go to any [facility] lab to have them done a week before his appt.